# Patient Record
Sex: MALE | Race: WHITE | Employment: FULL TIME | URBAN - METROPOLITAN AREA
[De-identification: names, ages, dates, MRNs, and addresses within clinical notes are randomized per-mention and may not be internally consistent; named-entity substitution may affect disease eponyms.]

---

## 2024-11-20 ENCOUNTER — APPOINTMENT (OUTPATIENT)
Dept: CT IMAGING | Facility: HOSPITAL | Age: 64
DRG: 349 | End: 2024-11-20
Attending: EMERGENCY MEDICINE
Payer: COMMERCIAL

## 2024-11-20 ENCOUNTER — HOSPITAL ENCOUNTER (INPATIENT)
Facility: HOSPITAL | Age: 64
LOS: 3 days | Discharge: HOME | End: 2024-11-23
Attending: EMERGENCY MEDICINE
Payer: COMMERCIAL

## 2024-11-20 ENCOUNTER — APPOINTMENT (OUTPATIENT)
Dept: CT IMAGING | Facility: HOSPITAL | Age: 64
End: 2024-11-20
Attending: EMERGENCY MEDICINE
Payer: COMMERCIAL

## 2024-11-20 ENCOUNTER — HOSPITAL ENCOUNTER (INPATIENT)
Facility: HOSPITAL | Age: 64
LOS: 3 days | Discharge: HOME | DRG: 349 | End: 2024-11-23
Attending: EMERGENCY MEDICINE | Admitting: HOSPITALIST
Payer: COMMERCIAL

## 2024-11-20 DIAGNOSIS — K61.0 ABSCESS, PERIANAL: ICD-10-CM

## 2024-11-20 DIAGNOSIS — K61.0 PERIANAL ABSCESS: Primary | ICD-10-CM

## 2024-11-20 LAB
ANION GAP SERPL CALC-SCNC: 6 MMOL/L (ref 0–18)
BASOPHILS # BLD AUTO: 0.11 X10(3) UL (ref 0–0.2)
BASOPHILS NFR BLD AUTO: 0.8 %
BILIRUB UR QL: NEGATIVE
BUN BLD-MCNC: 17 MG/DL (ref 9–23)
BUN/CREAT SERPL: 18.1 (ref 10–20)
CALCIUM BLD-MCNC: 9.4 MG/DL (ref 8.7–10.4)
CHLORIDE SERPL-SCNC: 107 MMOL/L (ref 98–112)
CLARITY UR: CLEAR
CO2 SERPL-SCNC: 27 MMOL/L (ref 21–32)
CREAT BLD-MCNC: 0.94 MG/DL
DEPRECATED RDW RBC AUTO: 46 FL (ref 35.1–46.3)
EGFRCR SERPLBLD CKD-EPI 2021: 92 ML/MIN/1.73M2 (ref 60–?)
EOSINOPHIL # BLD AUTO: 0.21 X10(3) UL (ref 0–0.7)
EOSINOPHIL NFR BLD AUTO: 1.6 %
ERYTHROCYTE [DISTWIDTH] IN BLOOD BY AUTOMATED COUNT: 13.5 % (ref 11–15)
GLUCOSE BLD-MCNC: 101 MG/DL (ref 70–99)
GLUCOSE UR-MCNC: NORMAL MG/DL
HCT VFR BLD AUTO: 41.7 %
HGB BLD-MCNC: 14.5 G/DL
HGB UR QL STRIP.AUTO: NEGATIVE
IMM GRANULOCYTES # BLD AUTO: 0.04 X10(3) UL (ref 0–1)
IMM GRANULOCYTES NFR BLD: 0.3 %
KETONES UR-MCNC: 20 MG/DL
LEUKOCYTE ESTERASE UR QL STRIP.AUTO: NEGATIVE
LYMPHOCYTES # BLD AUTO: 1.57 X10(3) UL (ref 1–4)
LYMPHOCYTES NFR BLD AUTO: 12 %
MCH RBC QN AUTO: 32.1 PG (ref 26–34)
MCHC RBC AUTO-ENTMCNC: 34.8 G/DL (ref 31–37)
MCV RBC AUTO: 92.3 FL
MONOCYTES # BLD AUTO: 2.02 X10(3) UL (ref 0.1–1)
MONOCYTES NFR BLD AUTO: 15.5 %
NEUTROPHILS # BLD AUTO: 9.1 X10 (3) UL (ref 1.5–7.7)
NEUTROPHILS # BLD AUTO: 9.1 X10(3) UL (ref 1.5–7.7)
NEUTROPHILS NFR BLD AUTO: 69.8 %
NITRITE UR QL STRIP.AUTO: NEGATIVE
OSMOLALITY SERPL CALC.SUM OF ELEC: 292 MOSM/KG (ref 275–295)
PH UR: 6.5 [PH] (ref 5–8)
PLATELET # BLD AUTO: 306 10(3)UL (ref 150–450)
POTASSIUM SERPL-SCNC: 4.2 MMOL/L (ref 3.5–5.1)
PROT UR-MCNC: NEGATIVE MG/DL
RBC # BLD AUTO: 4.52 X10(6)UL
SODIUM SERPL-SCNC: 140 MMOL/L (ref 136–145)
SP GR UR STRIP: >1.03 (ref 1–1.03)
UROBILINOGEN UR STRIP-ACNC: NORMAL
WBC # BLD AUTO: 13.1 X10(3) UL (ref 4–11)

## 2024-11-20 PROCEDURE — 0D9Q0ZZ DRAINAGE OF ANUS, OPEN APPROACH: ICD-10-PCS | Performed by: EMERGENCY MEDICINE

## 2024-11-20 PROCEDURE — 74177 CT ABD & PELVIS W/CONTRAST: CPT | Performed by: EMERGENCY MEDICINE

## 2024-11-20 RX ORDER — MORPHINE SULFATE 2 MG/ML
0.5 INJECTION, SOLUTION INTRAMUSCULAR; INTRAVENOUS EVERY 2 HOUR PRN
Status: DISCONTINUED | OUTPATIENT
Start: 2024-11-20 | End: 2024-11-23

## 2024-11-20 RX ORDER — MORPHINE SULFATE 2 MG/ML
2 INJECTION, SOLUTION INTRAMUSCULAR; INTRAVENOUS EVERY 2 HOUR PRN
Status: DISCONTINUED | OUTPATIENT
Start: 2024-11-20 | End: 2024-11-23

## 2024-11-20 RX ORDER — PROCHLORPERAZINE EDISYLATE 5 MG/ML
5 INJECTION INTRAMUSCULAR; INTRAVENOUS EVERY 8 HOURS PRN
Status: DISCONTINUED | OUTPATIENT
Start: 2024-11-20 | End: 2024-11-23

## 2024-11-20 RX ORDER — MORPHINE SULFATE 2 MG/ML
1 INJECTION, SOLUTION INTRAMUSCULAR; INTRAVENOUS EVERY 2 HOUR PRN
Status: DISCONTINUED | OUTPATIENT
Start: 2024-11-20 | End: 2024-11-23

## 2024-11-20 RX ORDER — HYDROMORPHONE HYDROCHLORIDE 1 MG/ML
0.4 INJECTION, SOLUTION INTRAMUSCULAR; INTRAVENOUS; SUBCUTANEOUS EVERY 2 HOUR PRN
Status: DISCONTINUED | OUTPATIENT
Start: 2024-11-20 | End: 2024-11-23

## 2024-11-20 RX ORDER — IBUPROFEN 600 MG/1
600 TABLET, FILM COATED ORAL EVERY 6 HOURS PRN
Status: DISCONTINUED | OUTPATIENT
Start: 2024-11-20 | End: 2024-11-23

## 2024-11-20 RX ORDER — COFFEE XT/PHOSPHATIDYL SERINE 50 MG-50MG
2 TABLET,CHEWABLE ORAL NIGHTLY
COMMUNITY

## 2024-11-20 RX ORDER — OXYCODONE HYDROCHLORIDE 5 MG/1
10 TABLET ORAL EVERY 4 HOURS PRN
Status: DISCONTINUED | OUTPATIENT
Start: 2024-11-20 | End: 2024-11-23

## 2024-11-20 RX ORDER — OXYCODONE HYDROCHLORIDE 5 MG/1
5 TABLET ORAL EVERY 4 HOURS PRN
Status: DISCONTINUED | OUTPATIENT
Start: 2024-11-20 | End: 2024-11-23

## 2024-11-20 RX ORDER — IBUPROFEN 400 MG/1
400 TABLET, FILM COATED ORAL EVERY 6 HOURS PRN
Status: DISCONTINUED | OUTPATIENT
Start: 2024-11-20 | End: 2024-11-23

## 2024-11-20 RX ORDER — ACETAMINOPHEN 500 MG
1000 TABLET ORAL EVERY 6 HOURS PRN
Status: DISCONTINUED | OUTPATIENT
Start: 2024-11-20 | End: 2024-11-23

## 2024-11-20 RX ORDER — BISACODYL 10 MG
10 SUPPOSITORY, RECTAL RECTAL
Status: DISCONTINUED | OUTPATIENT
Start: 2024-11-20 | End: 2024-11-23

## 2024-11-20 RX ORDER — LIDOCAINE HYDROCHLORIDE 10 MG/ML
20 INJECTION, SOLUTION EPIDURAL; INFILTRATION; INTRACAUDAL; PERINEURAL ONCE
Status: COMPLETED | OUTPATIENT
Start: 2024-11-20 | End: 2024-11-20

## 2024-11-20 RX ORDER — HYDROMORPHONE HYDROCHLORIDE 1 MG/ML
0.8 INJECTION, SOLUTION INTRAMUSCULAR; INTRAVENOUS; SUBCUTANEOUS EVERY 2 HOUR PRN
Status: DISCONTINUED | OUTPATIENT
Start: 2024-11-20 | End: 2024-11-23

## 2024-11-20 RX ORDER — ACETAMINOPHEN 500 MG
500 TABLET ORAL EVERY 4 HOURS PRN
Status: DISCONTINUED | OUTPATIENT
Start: 2024-11-20 | End: 2024-11-23

## 2024-11-20 RX ORDER — CHLORAL HYDRATE 500 MG
1000 CAPSULE ORAL
COMMUNITY

## 2024-11-20 RX ORDER — SODIUM CHLORIDE 9 MG/ML
125 INJECTION, SOLUTION INTRAVENOUS CONTINUOUS
Status: DISCONTINUED | OUTPATIENT
Start: 2024-11-20 | End: 2024-11-20

## 2024-11-20 RX ORDER — ONDANSETRON 2 MG/ML
4 INJECTION INTRAMUSCULAR; INTRAVENOUS EVERY 6 HOURS PRN
Status: DISCONTINUED | OUTPATIENT
Start: 2024-11-20 | End: 2024-11-23

## 2024-11-20 RX ORDER — MORPHINE SULFATE 4 MG/ML
4 INJECTION, SOLUTION INTRAMUSCULAR; INTRAVENOUS ONCE
Status: COMPLETED | OUTPATIENT
Start: 2024-11-20 | End: 2024-11-20

## 2024-11-20 RX ORDER — SODIUM PHOSPHATE, DIBASIC AND SODIUM PHOSPHATE, MONOBASIC 7; 19 G/230ML; G/230ML
1 ENEMA RECTAL ONCE AS NEEDED
Status: DISCONTINUED | OUTPATIENT
Start: 2024-11-20 | End: 2024-11-23

## 2024-11-20 RX ORDER — SENNOSIDES 8.6 MG
17.2 TABLET ORAL NIGHTLY PRN
Status: DISCONTINUED | OUTPATIENT
Start: 2024-11-20 | End: 2024-11-23

## 2024-11-20 RX ORDER — POLYETHYLENE GLYCOL 3350 17 G/17G
17 POWDER, FOR SOLUTION ORAL DAILY PRN
Status: DISCONTINUED | OUTPATIENT
Start: 2024-11-20 | End: 2024-11-23

## 2024-11-20 NOTE — ED QUICK NOTES
Dr. Taylor at bedside updating patient.    Orders for admission, patient is aware of plan and ready to go upstairs. Any questions, please call ED RN Carola at extension [phone].     Patient Covid vaccination status: Fully vaccinated     COVID Test Ordered in ED: None    COVID Suspicion at Admission: N/A    Running Infusions:  None    Mental Status/LOC at time of transport: A&Ox4    Other pertinent information:   CIWA score: N/A   NIH score:  N/A

## 2024-11-20 NOTE — CONSULTS
[facility]  Report of Consultation    Robin Oconnor Patient Status:  Inpatient    1960 MRN [id number]   Location [facility] 1W Attending Travon Saucedo MD   Hosp Day # 0 PCP No primary care provider on file.     Requesting Physician:  Emmanuel Taylor MD     Reason for Consultation:  Lucy-anal abscess    Chief Complaint:  Anal pain    History of Present Illness:  Robin Oconnor is a 64 year old male who presents to [facility] on 24 for rectal pain starting this past . It began as low grade and progressively worsened. He has felt similar pain in the past 3 years ago when he had a lucy-anal abscess that required I&D in the OR. He denies associated nausea or vomiting. He denies issues with diarrhea or constipation, last bowel movement was yesterday without blood in his stool. He denies discharge from his rectum.  He denies fever or chills.     Upon presentation to the hospital, patient afebrile and hemodynamically stable. WBCs 13.1, blood and abscess cultures pending. CT AP with findings of fluid collection posterior to anal sphincter measuring 2 x 4 x 4 cm concerning for perianal abscess. No soft tissue emphysema, no intrapelvic connection, no bowel obstruction. Patient started on IV antibiotics.     Past medical history significant for past perianal abscess requiring I&D,     Past abdominal surgical history negative as per patient.    Family history is negative for colon cancer, IBD  Patient has never had a colonoscopy  Patient denies taking blood thinners    History:  No past medical history on file.  History reviewed. No pertinent surgical history.  Family History   Problem Relation Age of Onset    No Known Problems Father     No Known Problems Mother       reports that he has been smoking cigarettes. He does not have any smokeless tobacco history on file. He reports current alcohol use. He reports that he does not use  drugs.    Allergies:  Allergies[1]    Medications:  Medications Prior to Admission   Medication Sig    Multiple Vitamins-Minerals (ONE A DAY MEN 50 PLUS OR) Take 1 tablet by mouth daily with dinner.    omega-3 fatty acids 1000 MG Oral Cap Take 1,000 mg by mouth daily with dinner.    Esomeprazole Magnesium 20 MG Oral Capsule Delayed Release Take 1 capsule (20 mg total) by mouth every morning before breakfast.    Misc Natural Products (NEURIVA) Oral Chew Tab Chew 2 tablets by mouth at bedtime.         Current Facility-Administered Medications:     sodium chloride 0.9% infusion, 125 mL/hr, Intravenous, Continuous    piperacillin-tazobactam (Zosyn) 4.5 g in dextrose 5% 100 mL IVPB-ADDV, 4.5 g, Intravenous, Q8H    Review of Systems:  Review of Systems   Constitutional:  Negative for chills, fatigue and fever.   Respiratory:  Negative for shortness of breath.    Cardiovascular:  Negative for chest pain.   Gastrointestinal:  Positive for rectal pain. Negative for nausea, vomiting, abdominal pain, diarrhea, constipation, blood in stool and anal bleeding.   Genitourinary:  Negative for bladder incontinence, dysuria and hematuria.       Physical Exam:  /73 (BP Location: Right arm)   Pulse 60   Temp 98.2 °F (36.8 °C) (Oral)   Resp 20   Ht 5' 10\" (1.778 m)   Wt 148 lb 3.2 oz (67.2 kg)   SpO2 98%   BMI 21.26 kg/m²   Physical Exam  Constitutional:       General: He is not in acute distress.     Appearance: Normal appearance. He is normal weight. He is not ill-appearing.   HENT:      Head: Normocephalic and atraumatic.      Mouth/Throat:      Mouth: Mucous membranes are moist.      Pharynx: Oropharynx is clear.   Eyes:      Conjunctiva/sclera: Conjunctivae normal.   Cardiovascular:      Rate and Rhythm: Normal rate and regular rhythm.      Pulses: Normal pulses.      Heart sounds: Normal heart sounds.   Pulmonary:      Effort: Pulmonary effort is normal.      Breath sounds: Normal breath sounds.   Abdominal:       General: Abdomen is flat. Bowel sounds are normal.      Palpations: Abdomen is soft.   Genitourinary:     Comments: External anal sphincter without swelling or obvious fluctuance. Dried blood from attempted I&D in ER. DARRICK with extreme pain and discomfort, mild prominence of swelling noted of posterior rectal wall  Neurological:      General: No focal deficit present.      Mental Status: He is alert and oriented to person, place, and time.   Psychiatric:         Mood and Affect: Mood normal.         Behavior: Behavior normal.         Laboratory Data:  Recent Labs   Lab 11/20/24  0803   RBC 4.52   HGB 14.5   HCT 41.7   MCV 92.3   MCH 32.1   MCHC 34.8   RDW 13.5   NEPRELIM 9.10*   WBC 13.1*   .0       Recent Labs   Lab 11/20/24  0803   *   BUN 17   CREATSERUM 0.94   CA 9.4      K 4.2      CO2 27.0         No results for input(s): \"PTP\", \"INR\", \"PTT\" in the last 168 hours.      CT ABDOMEN+PELVIS(CONTRAST ONLY)(CPT=74177)    Result Date: 11/20/2024  CONCLUSION: Posterior to the anal sphincters there is a 2 x 4 x 4 cm fluid collection, concerning for perianal abscess.  No soft tissue emphysema.  No intrapelvic extension.  No bowel obstruction   Dictated by (CST): Heber Ballesteros MD on 11/20/2024 at 9:23 AM     Finalized by (CST): Heber Ballesteros MD on 11/20/2024 at 9:29 AM                     Medical Decision Making         Impression:  Patient Active Problem List   Diagnosis    Perianal abscess       Perianal abscess    Plan:  Tentative plan for I&D in OR tomorrow with Dr. Beasley, patient is agreeable.  OK for diet, NPO at midnight  Continue to trend WBCs  Analgesics as needed  Continue IV antibiotics  Case discussed with Dr. Beasley.    Thank you for allowing me to participate in the care of the patient.   JUANA Siddiqi  11/20/2024  12:35 PM                           [1] No Known Allergies

## 2024-11-20 NOTE — ED QUICK NOTES
Patient and visitor at bedside updated on wait for CT scan and then dispo. Verbalized understanding, denies further needs at this time. Call light within reach.

## 2024-11-20 NOTE — PLAN OF CARE
Problem: Patient Centered Care  Goal: Patient preferences are identified and integrated in the patient's plan of care  Description: Interventions:  - What would you like us to know as we care for you? Lives at home  - Provide timely, complete, and accurate information to patient/family  - Incorporate patient and family knowledge, values, beliefs, and cultural backgrounds into the planning and delivery of care  - Encourage patient/family to participate in care and decision-making at the level they choose  - Honor patient and family perspectives and choices  Note: Received from ER with complaint of rectal pain, noted to have rectal abscess. Patient alert and oriented. Pain level 5/10 upon arrival to [facility]. Patient oriented to room, call light , plan of care discussed. Verbalized understanding. IVF infusing. NPO as ordered. Bed on low and locked position, call light within reach.

## 2024-11-20 NOTE — H&P
HOSPITALIST HISTORY AND PHYSICAL     CC:   Chief Complaint   Patient presents with    Anal Problem        PCP: No primary care provider on file.> about to establish w Jean Benitez    History of Present Illness:   Patient is a 64 year old male with PMH sig for psoriasis not on medication, hx yumiko-anal abscess 2021 that was drained (tx at [place]) here w 3 days severe pain similar to 2021 episode. Progressively worsening- localized to perianal region no radiation denies fevers or other focal sx. No known hx DM, not on immunosuppressives does have a long standing hx psoriasis. Labs/ CT as noted below.      No past medical history on file.   History reviewed. No pertinent surgical history.     ALL:  Allergies[1]         Social History     Tobacco Use    Smoking status: Every Day     Types: Cigarettes    Smokeless tobacco: Not on file   Substance Use Topics    Alcohol use: Yes     Comment: On weekends.        Fam Hx  No family history on file.    Review of Systems  10 point Comprehensive ROS reviewed and negative except for what's stated above.     OBJECTIVE:  /76   Pulse 64   Temp 98.2 °F (36.8 °C)   Resp 16   Ht 5' 10.5\" (1.791 m)   Wt 150 lb (68 kg)   SpO2 98%   BMI 21.22 kg/m²     GEN: NAD, AAO  NECK: supple, no LAD  HEENT- sclera anti-icteric, OP- MMM  CV: rrr, +s1/s2, PMI non displaced  LUNGS: CTAB, normal resp effort  ABL soft, NT/ND, NABS, no HSC  EXT: no LE edema b/l , DP pulses 2+ b/l  Neuro: sensation intact, no focal deficits  SKIN- red patchy plaques on elbows/ knees, yumiko-anal erythema   PSYCH- normal mentation, normal affect      LABS:   Lab Results   Component Value Date    WBC 13.1 11/20/2024    HGB 14.5 11/20/2024    HCT 41.7 11/20/2024    .0 11/20/2024    CREATSERUM 0.94 11/20/2024    BUN 17 11/20/2024     11/20/2024    K 4.2 11/20/2024     11/20/2024    CO2 27.0 11/20/2024     11/20/2024    CA 9.4 11/20/2024       Radiology: CT ABDOMEN+PELVIS(CONTRAST  ONLY)(CPT=74177)    Result Date: 11/20/2024  PROCEDURE: CT ABDOMEN + PELVIS (CONTRAST ONLY) (CPT=74177)  COMPARISON: None.  INDICATIONS: rectal pain  TECHNIQUE: CT images of the abdomen and pelvis were obtained with non-ionic intravenous contrast material.  Automated exposure control for dose reduction was used. Adjustment of the mA and/or kV was done based on the patient's size. Use of iterative reconstruction technique for dose reduction was used.  Dose information is transmitted to the ACR (American College of Radiology) NRDR (National Radiology Data Registry) which includes the Dose Index Registry.  FINDINGS:  LIVER: No enlargement, atrophy, abnormal density, or significant focal lesion.  BILIARY: The gallbladder is present.  No intra- or extrahepatic biliary ductal dilatation. SPLEEN: No enlargement or focal lesion.  STOMACH: No gastric obstruction.  Duodenum is unremarkable. PANCREAS: No lesion, fluid collection, ductal dilatation, or atrophy.  ADRENALS: No nodule or enlargement. KIDNEYS: No enhancing renal lesion or hydroureteronephrosis. AORTA/VASCULAR:   Aortic atherosclerosis without aneurysm.  No dissection. RETROPERITONEUM: No mass or enlarged adenopathy.  BOWEL/MESENTERY:  There is no small or large bowel obstruction. The terminal ileum and appendix (series 2, image 82) are within normal limits. There is scattered descending and sigmoid colonic diverticulosis. There is no free air, free fluid, or lymphadenopathy in the abdomen or pelvis. ABDOMINAL WALL: Curvilinear fluid collection measuring approximately 23 x 37 x 39 mm posterior to the anorectal junction to the level of the external anal sphincter, coursing from the posterior 5 o'clock to the right 9 o'clock position.  No soft tissue emphysema. URINARY BLADDER: No visible focal wall thickening, lesion or calculus.  PELVIC NODES: No enlarged mass or adenopathy.   PELVIC ORGANS: Prostate is enlarged measuring 55 mm in transverse diameter. BONES:    Mild-moderate degenerative endplate change and disc disease within the spine. LUNG BASES: Linear bands of atelectasis/scar in the lung bases.  The visualized heart has normal size.  No visible coronary artery calcifications. OTHER: Negative.          CONCLUSION: Posterior to the anal sphincters there is a 2 x 4 x 4 cm fluid collection, concerning for perianal abscess.  No soft tissue emphysema.  No intrapelvic extension.  No bowel obstruction   Dictated by (CST): Heber Ballesteros MD on 11/20/2024 at 9:23 AM     Finalized by (CST): Heber Ballesteros MD on 11/20/2024 at 9:29 AM               ASSESSMENT / PLAN:    Perianal abscess 2021 here w recurrent abscess  Leukocytosis from above  - d/w Dr Taylor- surgery consulted requested ID as well in anticipation of possible need for ongoing iv abx after dc  - bcx sent  - sp attempted bedside I and D w/o drainaeage f/u surg recs- NPO aft 12 in anticipation of drainage   - started zosyn   - f/u cx data and CBC  - re risk factors- has psoriasis. Not on immunosuppressives denies other known pmhx but will check HgA1c. Also no prior colonscopy    HTN  - hx of this noted in [place] chart denies dx and not on medications    Nicotine use hx  - not current active issue    Insomnia  - can do melatonin in house    DVT ppx- SCDs ordered not chemical in case of need for surigcal drainage     Est dc date 2 midnights    Outpatient records or previous hospital records reviewed as detailed above.    ROYA hospitalist to continue to follow patient while in house      ROYA Larios Hospitalist  Pager [phone]    11/20/2024  10:00 AM         [1] No Known Allergies

## 2024-11-20 NOTE — ED QUICK NOTES
Per MD, patient okay for transport. Patient transported to IP bed assignment via stretcher by EDT. Patient in stable condition, NAD, left department with all belongings.

## 2024-11-20 NOTE — CONSULTS
[facility]  part of [facility] Infectious Disease  Report of Consultation    Robin Oconnor Patient Status:  Inpatient    1960 MRN [id number]   Location [facility] 1W Attending Travon Saucedo MD   Hosp Day # 0 PCP No primary care provider on file.     Date of Admission:  2024  Date of Consult:  2024    Reason for Consultation:  Perianal abscess    History of Present Illness:  Robin Oconnor is a a(n) 64 year old male being seen at your request regarding a perirectal/perianal abscess.  Patient does have a h/o same, admitted to [facility] for a horseshoe perirectal abscess that required I&D 2021.    Patient reports he has never had a colonoscopy.    Patient presented to the ED today with a c/o rectal pain worsening for several days PTA.  He had a normal BM yesterday.  Patient did have area identified in ED with attempted I&D.  Minimal purulent material resulted.  CT was done and demonstrated deeper abscess.  Patient was stated on IV zosyn and admitted for further evaluation by general surgery.      Patient denies F/C.  No N/V/D.  No systemic s/s of sepsis.  We are asked to see and assist.    History:  No past medical history on file.  History reviewed. No pertinent surgical history.  No family history on file.   reports that he has been smoking cigarettes. He does not have any smokeless tobacco history on file. He reports current alcohol use. He reports that he does not use drugs.    Allergies:  Allergies[1]    Medications:    Current Facility-Administered Medications:     sodium chloride 0.9% infusion, 125 mL/hr, Intravenous, Continuous    Review of Systems:    Constitutional:  No fevers, chills, diaphoresis, weight changes.   HEENT:  No visual changes, oral ulcers, sore throat, difficulty swallowing.   Respiratory: Negative for cough, sputum, hemoptysis, chest pain, wheezing, dyspnea on exertion, or stridor.   Cardiovascular: Negative for chest pain,  palpitations, irregular heart beats.   Gastrointestinal:  Rectal pain.   Genitourinary:  No dysuria, hematuria, urine urgency or frequency.   Integument/breast: Negative for rash, skin lesions, and pruritus.   Hematologic/lymphatic: Negative for easy bruising, bleeding, and lymphadenopathy.   Musculoskeletal: Negative for myalgias, arthralgias, muscle weakness.   Neurological: No focal neurologic deficits, seizures, tremors.   Psych:  No h/o anxiety, depression, other psych d/o.   Endocrine: No history of of diabetes, thyroid disorder.    Remainder of 12 point review of systems otherwise negative.    Vital signs in last 24 hours:  Patient Vitals for the past 24 hrs:   BP Temp Temp src Pulse Resp SpO2 Height Weight   11/20/24 1105 135/73 98.2 °F (36.8 °C) Oral 60 20 98 % 5' 10\" (1.778 m) 148 lb 3.2 oz (67.2 kg)   11/20/24 0945 138/76 -- -- 64 16 98 % -- --   11/20/24 0800 122/65 -- -- 69 16 100 % -- --   11/20/24 0621 122/78 98.2 °F (36.8 °C) -- 95 21 95 % 5' 10.5\" (1.791 m) 150 lb (68 kg)       Intake/Output:  No intake/output data recorded.    Physical Exam:   General: Awake, alert, non-tox and in NAD.   Head: Normocephalic, without obvious abnormality, atraumatic.   Eyes: Conjunctivae/corneas clear.  No scleral icterus.  No conjunctival     hemorrhage.   Nose: Nares normal.   Throat:  Oropharynx clear, MMs moist.   Neck: Trachea ML, no masses.   Lungs: CTA b/l no rhonchi, rales, wheezes.   Chest wall: No tenderness or deformity.   Heart: Regular rate and rhythm, normal S1S2, no murmurs.   Abdomen: Soft, NT/ND.  Bowel sounds present.  No organomegaly.   Extremity: No edema.   Skin: No rashes or lesions.   Neurological: No focal neurologic deficits.    Lab Data Review:  Lab Results   Component Value Date    WBC 13.1 11/20/2024    HGB 14.5 11/20/2024    HCT 41.7 11/20/2024    .0 11/20/2024    CREATSERUM 0.94 11/20/2024    BUN 17 11/20/2024     11/20/2024    K 4.2 11/20/2024     11/20/2024    CO2  27.0 11/20/2024     11/20/2024    CA 9.4 11/20/2024      Cultures:   Blood cultures obtained  Culture from rectal drainage obtained    Radiology:    CONCLUSION: Posterior to the anal sphincters there is a 2 x 4 x 4 cm fluid collection, concerning for perianal abscess.  No soft tissue emphysema.  No intrapelvic extension.  No bowel obstruction     Assessment and Plan:    Complicated perianal abscess in this patient who presented with rectal pain and a h/o similar presentation in 2021 at [facility]  - s/p attempted I&D in the ED, minimal purulent material drained  - CT with 2x4x4 fluid collection, general surgery to see  - Blood cultures obtained  - Abscess cultures obtained  - IV zosyn started    2.  H/o horseshoe perirectal abscess in 2021 which required surgical drainage 5/8/21 at [facility]  - Patient has never had a colonoscopy    3.  H/o psoriasis  - At risk, theoretically, for other autoimmune processes ?Crohn's disease, etc.    4.  Disposition - inpatient.  Continue IV zosyn and f/u all cultures.  Awaiting surgical input/intervention.  Final choice, route, and duration of antibiotics to be determined pending clinical course.  Last time he was able to step down to p.o. Rx at discharge.  Will need a colonoscopy after acute infection resolves.  Will follow.    Madelyn Barragan DO, [facility] Infect[facility]  [phone]    11/20/2024  11:17 AM         [1] No Known Allergies

## 2024-11-20 NOTE — ED QUICK NOTES
Pt A&Ox4.  Pt states rectal pain x3 days. Pt states abscess to area x3 years ago that required I&D. Pt denies drainage, fever, or chills.

## 2024-11-20 NOTE — ED INITIAL ASSESSMENT (HPI)
Pt to ed w/c of rectal problem. States he has been seen before for same issue but unsure if he was told he had an abscess. PT reports they drained the area before since he had an infection. Pt reports pain 10/10. Denies bleeding.

## 2024-11-20 NOTE — ED PROVIDER NOTES
Patient Seen in: [facility] Emergency Department      History     Chief Complaint   Patient presents with    Anal Problem     Stated Complaint: rectal pain    Subjective:   HPI      64-year-old male with history of perianal abscess post incision and drainage in 2021 rectal pain.  The patient reports pain over the past 4 days which has increased over the past 2 days.  He denies any anterior abdominal pain.  No fevers.  No nausea or vomiting.  No diarrhea.  He reports having a normal bowel movement yesterday.    Objective:     No pertinent past medical history.            History reviewed. No pertinent surgical history.             No pertinent social history.                Physical Exam     ED Triage Vitals [11/20/24 0621]   /78   Pulse 95   Resp 21   Temp 98.2 °F (36.8 °C)   Temp src    SpO2 95 %   O2 Device None (Room air)       Current Vitals:   Vital Signs  BP: 138/76  Pulse: 64  Resp: 16  Temp: 98.2 °F (36.8 °C)  MAP (mmHg): 91    Oxygen Therapy  SpO2: 98 %  O2 Device: None (Room air)        Physical Exam    General Appearance: alert, no distress  Eyes: pupils equal and round no pallor or injection  ENT, Mouth: mucous membranes moist  Respiratory: there are no retractions, lungs are clear to auscultation  Cardiovascular: regular rate and rhythm  Gastrointestinal:  abdomen is soft and non tender, no masses, bowel sounds normal  Rectal: No masses noted.  There is erythema, mild fluctuance, and tenderness noted to the left perianal area near the anus at the 8 o'clock position drainage noted.  Neurological: Speech normal.  Moving extremities x 4.  Skin: warm and dry, no rashes.  Musculoskeletal: neck is supple non tender        Extremities are symmetrical, full range of motion  Psychiatric: patient is oriented X 3, there is no agitation    DIFFERENTIAL DIAGNOSIS: After history and physical exam differential diagnosis was considered for perianal abscess or other        ED Course     Labs Reviewed    BASIC METABOLIC PANEL (8) - Abnormal; Notable for the following components:       Result Value    Glucose 101 (*)     All other components within normal limits   CBC WITH DIFFERENTIAL WITH PLATELET - Abnormal; Notable for the following components:    WBC 13.1 (*)     Neutrophil Absolute Prelim 9.10 (*)     All other components within normal limits   SCAN SLIDE   MD BLOOD SMEAR CONSULT   URINALYSIS WITH CULTURE REFLEX   AEROBIC BACTERIAL CULTURE   BLOOD CULTURE   BLOOD CULTURE                 MDM      Procedure:    Abscess drainage:  The patient abscess was located perirectal area.  I obtained verbal consent from the patient to drain the abscess who was informed about the possibility of bleeding, pain and worsening of the condition. The abscess was incised with a scalpel and minimal to no purulent drainage was expressed.  The patient tolerated the procedure well.  The procedure was performed by myself.    Laboratory studies were then obtained and the patient was sent for a CT which shows a perianal abscess.  Plan to admit for surgical evaluation and IV antibiotics.  Communicated with Dr. Barba, general surgery.  Also discussed with Dr. Saucedo, hospitalist.  Also communicated with Dr. Barragan, infectious disease.  She recommends antibiotics as ordered.  Admission disposition: 11/20/2024 10:05 AM           Medical Decision Making      Disposition and Plan     Clinical Impression:  1. Perianal abscess         Disposition:  Admit  11/20/2024 10:05 am    Follow-up:  No follow-up provider specified.        Medications Prescribed:  There are no discharge medications for this patient.          Supplementary Documentation:         Hospital Problems       Present on Admission             ICD-10-CM Noted POA    * (Principal) Perianal abscess K61.0 11/20/2024 Unknown

## 2024-11-21 ENCOUNTER — ANESTHESIA (OUTPATIENT)
Dept: SURGERY | Facility: HOSPITAL | Age: 64
End: 2024-11-21
Payer: COMMERCIAL

## 2024-11-21 ENCOUNTER — ANESTHESIA EVENT (OUTPATIENT)
Dept: SURGERY | Facility: HOSPITAL | Age: 64
End: 2024-11-21
Payer: COMMERCIAL

## 2024-11-21 LAB
ANION GAP SERPL CALC-SCNC: 7 MMOL/L (ref 0–18)
BASOPHILS # BLD: 0 X10(3) UL (ref 0–0.2)
BASOPHILS NFR BLD: 0 %
BUN BLD-MCNC: 15 MG/DL (ref 9–23)
BUN/CREAT SERPL: 16.7 (ref 10–20)
CALCIUM BLD-MCNC: 9.1 MG/DL (ref 8.7–10.4)
CHLORIDE SERPL-SCNC: 104 MMOL/L (ref 98–112)
CO2 SERPL-SCNC: 26 MMOL/L (ref 21–32)
CREAT BLD-MCNC: 0.9 MG/DL
DEPRECATED RDW RBC AUTO: 43.5 FL (ref 35.1–46.3)
EGFRCR SERPLBLD CKD-EPI 2021: 97 ML/MIN/1.73M2 (ref 60–?)
EOSINOPHIL # BLD: 0.48 X10(3) UL (ref 0–0.7)
EOSINOPHIL NFR BLD: 3 %
ERYTHROCYTE [DISTWIDTH] IN BLOOD BY AUTOMATED COUNT: 13.2 % (ref 11–15)
EST. AVERAGE GLUCOSE BLD GHB EST-MCNC: 111 MG/DL (ref 68–126)
GLUCOSE BLD-MCNC: 95 MG/DL (ref 70–99)
HBA1C MFR BLD: 5.5 % (ref ?–5.7)
HCT VFR BLD AUTO: 41.3 %
HGB BLD-MCNC: 14.4 G/DL
LYMPHOCYTES NFR BLD: 13 %
LYMPHOCYTES NFR BLD: 2.09 X10(3) UL (ref 1–4)
MCH RBC QN AUTO: 31.2 PG (ref 26–34)
MCHC RBC AUTO-ENTMCNC: 34.9 G/DL (ref 31–37)
MCV RBC AUTO: 89.4 FL
MONOCYTES # BLD: 1.45 X10(3) UL (ref 0.1–1)
MONOCYTES NFR BLD: 9 %
MORPHOLOGY: NORMAL
MYELOCYTES # BLD: 0.16 X10(3) UL
MYELOCYTES NFR BLD: 1 %
NEUTROPHILS # BLD AUTO: 11.92 X10 (3) UL (ref 1.5–7.7)
NEUTROPHILS NFR BLD: 73 %
NEUTS BAND NFR BLD: 1 %
NEUTS HYPERSEG # BLD: 11.91 X10(3) UL (ref 1.5–7.7)
OSMOLALITY SERPL CALC.SUM OF ELEC: 285 MOSM/KG (ref 275–295)
PLATELET # BLD AUTO: 324 10(3)UL (ref 150–450)
PLATELET MORPHOLOGY: NORMAL
POTASSIUM SERPL-SCNC: 3.8 MMOL/L (ref 3.5–5.1)
RBC # BLD AUTO: 4.62 X10(6)UL
SODIUM SERPL-SCNC: 137 MMOL/L (ref 136–145)
TOTAL CELLS COUNTED BLD: 100
WBC # BLD AUTO: 16.1 X10(3) UL (ref 4–11)

## 2024-11-21 PROCEDURE — 99223 1ST HOSP IP/OBS HIGH 75: CPT | Performed by: STUDENT IN AN ORGANIZED HEALTH CARE EDUCATION/TRAINING PROGRAM

## 2024-11-21 PROCEDURE — 46050 I&D PERIANAL ABSCESS SUPFC: CPT | Performed by: STUDENT IN AN ORGANIZED HEALTH CARE EDUCATION/TRAINING PROGRAM

## 2024-11-21 PROCEDURE — 0HB9XZX EXCISION OF PERINEUM SKIN, EXTERNAL APPROACH, DIAGNOSTIC: ICD-10-PCS | Performed by: STUDENT IN AN ORGANIZED HEALTH CARE EDUCATION/TRAINING PROGRAM

## 2024-11-21 PROCEDURE — 46606 ANOSCOPY AND BIOPSY: CPT | Performed by: STUDENT IN AN ORGANIZED HEALTH CARE EDUCATION/TRAINING PROGRAM

## 2024-11-21 RX ORDER — NALOXONE HYDROCHLORIDE 0.4 MG/ML
80 INJECTION, SOLUTION INTRAMUSCULAR; INTRAVENOUS; SUBCUTANEOUS AS NEEDED
Status: DISCONTINUED | OUTPATIENT
Start: 2024-11-21 | End: 2024-11-21 | Stop reason: HOSPADM

## 2024-11-21 RX ORDER — SODIUM CHLORIDE, SODIUM LACTATE, POTASSIUM CHLORIDE, CALCIUM CHLORIDE 600; 310; 30; 20 MG/100ML; MG/100ML; MG/100ML; MG/100ML
INJECTION, SOLUTION INTRAVENOUS CONTINUOUS
Status: DISCONTINUED | OUTPATIENT
Start: 2024-11-21 | End: 2024-11-21 | Stop reason: HOSPADM

## 2024-11-21 RX ORDER — OMEGA-3 FATTY ACIDS/FISH OIL 300-1000MG
200 CAPSULE ORAL EVERY 6 HOURS PRN
Qty: 30 CAPSULE | Refills: 0 | Status: SHIPPED | OUTPATIENT
Start: 2024-11-21 | End: 2024-12-21

## 2024-11-21 RX ORDER — PROCHLORPERAZINE EDISYLATE 5 MG/ML
5 INJECTION INTRAMUSCULAR; INTRAVENOUS EVERY 8 HOURS PRN
Status: DISCONTINUED | OUTPATIENT
Start: 2024-11-21 | End: 2024-11-21 | Stop reason: HOSPADM

## 2024-11-21 RX ORDER — HYDROMORPHONE HYDROCHLORIDE 1 MG/ML
0.6 INJECTION, SOLUTION INTRAMUSCULAR; INTRAVENOUS; SUBCUTANEOUS EVERY 5 MIN PRN
Status: DISCONTINUED | OUTPATIENT
Start: 2024-11-21 | End: 2024-11-21 | Stop reason: HOSPADM

## 2024-11-21 RX ORDER — LIDOCAINE HYDROCHLORIDE 10 MG/ML
INJECTION, SOLUTION EPIDURAL; INFILTRATION; INTRACAUDAL; PERINEURAL AS NEEDED
Status: DISCONTINUED | OUTPATIENT
Start: 2024-11-21 | End: 2024-11-21 | Stop reason: SURG

## 2024-11-21 RX ORDER — MORPHINE SULFATE 4 MG/ML
2 INJECTION, SOLUTION INTRAMUSCULAR; INTRAVENOUS EVERY 10 MIN PRN
Status: DISCONTINUED | OUTPATIENT
Start: 2024-11-21 | End: 2024-11-21 | Stop reason: HOSPADM

## 2024-11-21 RX ORDER — ONDANSETRON 2 MG/ML
4 INJECTION INTRAMUSCULAR; INTRAVENOUS EVERY 6 HOURS PRN
Status: DISCONTINUED | OUTPATIENT
Start: 2024-11-21 | End: 2024-11-21 | Stop reason: HOSPADM

## 2024-11-21 RX ORDER — HYDROMORPHONE HYDROCHLORIDE 1 MG/ML
0.4 INJECTION, SOLUTION INTRAMUSCULAR; INTRAVENOUS; SUBCUTANEOUS EVERY 5 MIN PRN
Status: DISCONTINUED | OUTPATIENT
Start: 2024-11-21 | End: 2024-11-21 | Stop reason: HOSPADM

## 2024-11-21 RX ORDER — SODIUM CHLORIDE, SODIUM LACTATE, POTASSIUM CHLORIDE, CALCIUM CHLORIDE 600; 310; 30; 20 MG/100ML; MG/100ML; MG/100ML; MG/100ML
INJECTION, SOLUTION INTRAVENOUS CONTINUOUS PRN
Status: DISCONTINUED | OUTPATIENT
Start: 2024-11-21 | End: 2024-11-21 | Stop reason: SURG

## 2024-11-21 RX ORDER — OXYCODONE HYDROCHLORIDE 5 MG/1
5 TABLET ORAL EVERY 4 HOURS PRN
Qty: 3 TABLET | Refills: 0 | Status: SHIPPED | OUTPATIENT
Start: 2024-11-21

## 2024-11-21 RX ORDER — MORPHINE SULFATE 10 MG/ML
6 INJECTION, SOLUTION INTRAMUSCULAR; INTRAVENOUS EVERY 10 MIN PRN
Status: DISCONTINUED | OUTPATIENT
Start: 2024-11-21 | End: 2024-11-21 | Stop reason: HOSPADM

## 2024-11-21 RX ORDER — BUPIVACAINE HYDROCHLORIDE AND EPINEPHRINE 2.5; 5 MG/ML; UG/ML
INJECTION, SOLUTION INFILTRATION; PERINEURAL AS NEEDED
Status: DISCONTINUED | OUTPATIENT
Start: 2024-11-21 | End: 2024-11-21 | Stop reason: HOSPADM

## 2024-11-21 RX ORDER — DEXAMETHASONE SODIUM PHOSPHATE 4 MG/ML
VIAL (ML) INJECTION AS NEEDED
Status: DISCONTINUED | OUTPATIENT
Start: 2024-11-21 | End: 2024-11-21 | Stop reason: SURG

## 2024-11-21 RX ORDER — POTASSIUM CHLORIDE 1500 MG/1
40 TABLET, EXTENDED RELEASE ORAL ONCE
Status: COMPLETED | OUTPATIENT
Start: 2024-11-21 | End: 2024-11-21

## 2024-11-21 RX ORDER — PHENYLEPHRINE HCL 10 MG/ML
VIAL (ML) INJECTION AS NEEDED
Status: DISCONTINUED | OUTPATIENT
Start: 2024-11-21 | End: 2024-11-21 | Stop reason: SURG

## 2024-11-21 RX ORDER — HYDROMORPHONE HYDROCHLORIDE 1 MG/ML
0.2 INJECTION, SOLUTION INTRAMUSCULAR; INTRAVENOUS; SUBCUTANEOUS EVERY 5 MIN PRN
Status: DISCONTINUED | OUTPATIENT
Start: 2024-11-21 | End: 2024-11-21 | Stop reason: HOSPADM

## 2024-11-21 RX ORDER — ONDANSETRON 2 MG/ML
INJECTION INTRAMUSCULAR; INTRAVENOUS AS NEEDED
Status: DISCONTINUED | OUTPATIENT
Start: 2024-11-21 | End: 2024-11-21 | Stop reason: SURG

## 2024-11-21 RX ORDER — MORPHINE SULFATE 4 MG/ML
4 INJECTION, SOLUTION INTRAMUSCULAR; INTRAVENOUS EVERY 10 MIN PRN
Status: DISCONTINUED | OUTPATIENT
Start: 2024-11-21 | End: 2024-11-21 | Stop reason: HOSPADM

## 2024-11-21 RX ORDER — ACETAMINOPHEN 500 MG
1000 TABLET ORAL EVERY 4 HOURS PRN
Qty: 30 TABLET | Refills: 0 | Status: SHIPPED | OUTPATIENT
Start: 2024-11-21

## 2024-11-21 RX ADMIN — PHENYLEPHRINE HCL 100 MCG: 10 MG/ML VIAL (ML) INJECTION at 14:22:00

## 2024-11-21 RX ADMIN — SODIUM CHLORIDE, SODIUM LACTATE, POTASSIUM CHLORIDE, CALCIUM CHLORIDE: 600; 310; 30; 20 INJECTION, SOLUTION INTRAVENOUS at 14:10:00

## 2024-11-21 RX ADMIN — PHENYLEPHRINE HCL 50 MCG: 10 MG/ML VIAL (ML) INJECTION at 14:36:00

## 2024-11-21 RX ADMIN — DEXAMETHASONE SODIUM PHOSPHATE 8 MG: 4 MG/ML VIAL (ML) INJECTION at 14:25:00

## 2024-11-21 RX ADMIN — ONDANSETRON 4 MG: 2 INJECTION INTRAMUSCULAR; INTRAVENOUS at 14:45:00

## 2024-11-21 RX ADMIN — LIDOCAINE HYDROCHLORIDE 50 MG: 10 INJECTION, SOLUTION EPIDURAL; INFILTRATION; INTRACAUDAL; PERINEURAL at 14:16:00

## 2024-11-21 NOTE — PROGRESS NOTES
[facility] Hospitalist Progress Note     CC: Hospital Follow up    PCP: No primary care provider on file.       Assessment/Plan:     Principal Problem:    Perianal abscess    64 M with PMH psoriasis who presents with recurrent perianal abscess    Perianal abscess 2021 here w recurrent abscess   -Appreciate general surgery consultation planning I&D in the OR today  -Continue Zosyn follow-up cultures  -ID service is following appreciate consultation  -A1c 5.5   -Trend WBC, leukocytosis is slightly increased today continue to trend    2. HTN?  -Not taking any medications monitor for now    FN:  IVF:None  Diet:NPO pending OR     Prophylaxis:   DVT: Hold pending OR     Dispo: Monitor inpatient   Code status: FULL CODE     Questions/concerns were discussed with patient and/or family by bedside.    Thank You,  John Olivares,     Hospitalist with [facility]     Subjective:     Feels well, rectal pain is improved, no fevers or chills, no chest pains, no SOB     OBJECTIVE:    Blood pressure 139/74, pulse 66, temperature 98.3 °F (36.8 °C), resp. rate 14, height 5' 10\" (1.778 m), weight 150 lb 6.4 oz (68.2 kg), SpO2 98%.    Temp:  [98.3 °F (36.8 °C)-100.1 °F (37.8 °C)] 98.3 °F (36.8 °C)  Pulse:  [63-85] 66  Resp:  [14-20] 14  BP: (112-139)/(56-74) 139/74  SpO2:  [97 %-98 %] 98 %      Intake/Output:    Intake/Output Summary (Last 24 hours) at 11/21/2024 1309  Last data filed at 11/21/2024 1115  Gross per 24 hour   Intake 1867 ml   Output 1320 ml   Net 547 ml       Last 3 Weights   11/21/24 0500 150 lb 6.4 oz (68.2 kg)   11/20/24 1105 148 lb 3.2 oz (67.2 kg)   11/20/24 0621 150 lb (68 kg)       Exam   Gen: No acute distress, alert and oriented x 3  Pulm: Lungs clear, normal respiratory effort  CV: Heart with regular rate and rhythm  Abd: Abdomen soft, nontender, non-distended  MSK: no clubbing, no cyanosis        Data Review:       Labs:     Recent Labs   Lab 11/20/24  0803 11/21/24  0644   RBC 4.52 4.62    HGB 14.5 14.4   HCT 41.7 41.3   MCV 92.3 89.4   MCH 32.1 31.2   MCHC 34.8 34.9   RDW 13.5 13.2   NEPRELIM 9.10* 11.92*   WBC 13.1* 16.1*   .0 324.0         Recent Labs   Lab 11/20/24  0803 11/21/24  0645   * 95   BUN 17 15   CREATSERUM 0.94 0.90   EGFRCR 92 97   CA 9.4 9.1    137   K 4.2 3.8    104   CO2 27.0 26.0       No results for input(s): \"ALT\", \"AST\", \"ALB\", \"AMYLASE\", \"LIPASE\", \"LDH\" in the last 168 hours.    Invalid input(s): \"ALPHOS\", \"TBIL\", \"DBIL\", \"TPROT\"      Imaging:  CT ABDOMEN+PELVIS(CONTRAST ONLY)(CPT=74177)    Result Date: 11/20/2024  CONCLUSION: Posterior to the anal sphincters there is a 2 x 4 x 4 cm fluid collection, concerning for perianal abscess.  No soft tissue emphysema.  No intrapelvic extension.  No bowel obstruction   Dictated by (CST): Heber Ballesteros MD on 11/20/2024 at 9:23 AM     Finalized by (CST): Heber Ballesteros MD on 11/20/2024 at 9:29 AM             Meds:      [Transfer Hold] potassium chloride  40 mEq Oral Once    piperacillin-tazobactam  4.5 g Intravenous Q8H         [Transfer Hold] acetaminophen    [Transfer Hold] polyethylene glycol (PEG 3350)    [Transfer Hold] sennosides    [Transfer Hold] bisacodyl    [Transfer Hold] fleet enema    [Transfer Hold] ondansetron    [Transfer Hold] prochlorperazine    [Transfer Hold] ibuprofen **OR** [Transfer Hold] ibuprofen    [Transfer Hold] morphINE **OR** [Transfer Hold] morphINE **OR** [Transfer Hold] morphINE    [Transfer Hold] oxyCODONE **OR** [Transfer Hold] oxyCODONE    [Transfer Hold] HYDROmorphone **OR** [Transfer Hold] HYDROmorphone    [Transfer Hold] acetaminophen

## 2024-11-21 NOTE — PAYOR COMM NOTE
ADMISSION REVIEW     Payor: Levine, Susan. \Hospital Has a New Name and Outlook.\"" RESOURCES CHOICE PLUS  Subscriber #:  [id number]  Authorization Number: [id number]    Admit date: 11/20/24  Admit time: 10:56 AM       REVIEW DOCUMENTATION:     ED Provider Notes        ED Provider Notes signed by Emmanuel Taylor MD at 11/20/2024 10:13 AM       Author: Emmanuel Taylor MD Service: -- Author Type: Physician    Filed: 11/20/2024 10:13 AM Date of Service: 11/20/2024  7:14 AM Status: Signed    : Emmanuel Taylor MD (Physician)           Patient Seen in: [facility] Emergency Department      History     Chief Complaint   Patient presents with    Anal Problem     Stated Complaint: rectal pain    Subjective:   HPI      64-year-old male with history of perianal abscess post incision and drainage in 2021 rectal pain.  The patient reports pain over the past 4 days which has increased over the past 2 days.  He denies any anterior abdominal pain.  No fevers.  No nausea or vomiting.  No diarrhea.  He reports having a normal bowel movement yesterday.    Objective:     No pertinent past medical history.            History reviewed. No pertinent surgical history.             No pertinent social history.                Physical Exam     ED Triage Vitals [11/20/24 0621]   /78   Pulse 95   Resp 21   Temp 98.2 °F (36.8 °C)   Temp src    SpO2 95 %   O2 Device None (Room air)       Current Vitals:   Vital Signs  BP: 138/76  Pulse: 64  Resp: 16  Temp: 98.2 °F (36.8 °C)  MAP (mmHg): 91    Oxygen Therapy  SpO2: 98 %  O2 Device: None (Room air)        Physical Exam    General Appearance: alert, no distress  Eyes: pupils equal and round no pallor or injection  ENT, Mouth: mucous membranes moist  Respiratory: there are no retractions, lungs are clear to auscultation  Cardiovascular: regular rate and rhythm  Gastrointestinal:  abdomen is soft and non tender, no masses, bowel sounds normal  Rectal: No masses noted.  There is erythema, mild fluctuance, and  tenderness noted to the left perianal area near the anus at the 8 o'clock position drainage noted.  Neurological: Speech normal.  Moving extremities x 4.  Skin: warm and dry, no rashes.  Musculoskeletal: neck is supple non tender        Extremities are symmetrical, full range of motion  Psychiatric: patient is oriented X 3, there is no agitation    DIFFERENTIAL DIAGNOSIS: After history and physical exam differential diagnosis was considered for perianal abscess or other        ED Course     Labs Reviewed   BASIC METABOLIC PANEL (8) - Abnormal; Notable for the following components:       Result Value    Glucose 101 (*)     All other components within normal limits   CBC WITH DIFFERENTIAL WITH PLATELET - Abnormal; Notable for the following components:    WBC 13.1 (*)     Neutrophil Absolute Prelim 9.10 (*)     All other components within normal limits   SCAN SLIDE   MD BLOOD SMEAR CONSULT   URINALYSIS WITH CULTURE REFLEX   AEROBIC BACTERIAL CULTURE   BLOOD CULTURE   BLOOD CULTURE                MDM      Procedure:    Abscess drainage:  The patient abscess was located perirectal area.  I obtained verbal consent from the patient to drain the abscess who was informed about the possibility of bleeding, pain and worsening of the condition. The abscess was incised with a scalpel and minimal to no purulent drainage was expressed.  The patient tolerated the procedure well.  The procedure was performed by myself.    Laboratory studies were then obtained and the patient was sent for a CT which shows a perianal abscess.  Plan to admit for surgical evaluation and IV antibiotics.  Communicated with Dr. Barba, general surgery.  Also discussed with Dr. Saucedo, hospitalist.  Also communicated with Dr. Barragan, infectious disease.  She recommends antibiotics as ordered.  Admission disposition: 11/20/2024 10:05 AM           Medical Decision Making      Disposition and Plan     Clinical Impression:  1. Perianal abscess          Disposition:  Admit  11/20/2024 10:05 am    Follow-up:  No follow-up provider specified.        Medications Prescribed:  There are no discharge medications for this patient.          Supplementary Documentation:         Hospital Problems       Present on Admission             ICD-10-CM Noted POA    * (Principal) Perianal abscess K61.0 11/20/2024 Unknown              Signed by Emmanuel Taylor MD on 11/20/2024 10:13 AM         MEDICATIONS ADMINISTERED IN LAST 1 DAY:  ibuprofen (Motrin) tab 600 mg       Date Action Dose Route User    11/20/2024 1556 Given 600 mg Oral LacieKati tanner RN          oxyCODONE immediate release tab 10 mg       Date Action Dose Route User    11/20/2024 1941 Given 10 mg Oral CurioMeenakshi RN          piperacillin-tazobactam (Zosyn) 4.5 g in dextrose 5% 100 mL IVPB-ADDV       Date Action Dose Route User    11/21/2024 0645 New Bag 4.5 g Intravenous CurMeenakshi bunch RN    11/20/2024 2159 New Bag 4.5 g Intravenous CurioMeenakshi RN    11/20/2024 1427 New Bag 4.5 g Intravenous LacieKati tanner RN            Vitals (last day)       Date/Time Temp Pulse Resp BP SpO2 Weight O2 Device O2 Flow Rate (L/min) [facility]    11/21/24 1307 98.3 °F (36.8 °C) 66 14 139/74 98 % -- None (Room air) -- DM    11/21/24 0754 98.3 °F (36.8 °C) 67 20 115/67 97 % -- None (Room air) -- MM    11/21/24 0500 -- -- -- -- -- 150 lb 6.4 oz (68.2 kg) -- --     11/21/24 0500 99.5 °F (37.5 °C) 63 20 112/56 97 % -- None (Room air) -- AS    11/20/24 1940 100.1 °F (37.8 °C) 85 20 121/62 97 % -- None (Room air) --     11/20/24 1747 99.7 °F (37.6 °C) 84 20 124/59 98 % -- None (Room air) -- MM    11/20/24 1105 98.2 °F (36.8 °C) 60 20 135/73 98 % 148 lb 3.2 oz (67.2 kg) None (Room air) -- MM    11/20/24 0945 -- 64 16 138/76 98 % -- None (Room air) -- SW    11/20/24 0800 -- 69 16 122/65 100 % -- None (Room air) -- SW    11/20/24 0621 98.2 °F (36.8 °C) 95 21 122/78 95 % 150 lb (68 kg) None (Room air) -- VJ           11/20/2024 H&P  Travon Saucedo MD  Physician  Specialty: HOSPITALIST     H&P     Addendum     Date of Service: 11/20/2024 10:00 AM     Expand All Collapse All[]Expand All by Default    HOSPITALIST HISTORY AND PHYSICAL      CC:       Chief Complaint   Patient presents with    Anal Problem         PCP: No primary care provider on file.> about to establish w Jean Benitez     History of Present Illness:   Patient is a 64 year old male with PMH sig for psoriasis not on medication, hx yumiko-anal abscess 2021 that was drained (tx at [place]) here w 3 days severe pain similar to 2021 episode. Progressively worsening- localized to perianal region no radiation denies fevers or other focal sx. No known hx DM, not on immunosuppressives does have a long standing hx psoriasis. Labs/ CT as noted below.        Past Medical History   No past medical history on file.      Past Surgical History   History reviewed. No pertinent surgical history.         ALL:  [Allergies]     [Allergies]  No Known Allergies     Scheduled Meds            Social History            Tobacco Use    Smoking status: Every Day       Types: Cigarettes    Smokeless tobacco: Not on file   Substance Use Topics    Alcohol use: Yes       Comment: On weekends.         Fam Hx  Family History   No family history on file.        Review of Systems  10 point Comprehensive ROS reviewed and negative except for what's stated above.      OBJECTIVE:  /76   Pulse 64   Temp 98.2 °F (36.8 °C)   Resp 16   Ht 5' 10.5\" (1.791 m)   Wt 150 lb (68 kg)   SpO2 98%   BMI 21.22 kg/m²      GEN: NAD, AAO  NECK: supple, no LAD  HEENT- sclera anti-icteric, OP- MMM  CV: rrr, +s1/s2, PMI non displaced  LUNGS: CTAB, normal resp effort  ABL soft, NT/ND, NABS, no HSC  EXT: no LE edema b/l , DP pulses 2+ b/l  Neuro: sensation intact, no focal deficits  SKIN- red patchy plaques on elbows/ knees, yumiko-anal erythema   PSYCH- normal mentation, normal affect        LABS:         Lab  Results   Component Value Date     WBC 13.1 11/20/2024     HGB 14.5 11/20/2024     HCT 41.7 11/20/2024     .0 11/20/2024     CREATSERUM 0.94 11/20/2024     BUN 17 11/20/2024      11/20/2024     K 4.2 11/20/2024      11/20/2024     CO2 27.0 11/20/2024      11/20/2024     CA 9.4 11/20/2024         Radiology: CT ABDOMEN+PELVIS(CONTRAST ONLY)(CPT=74177)     Result Date: 11/20/2024  PROCEDURE:         CT ABDOMEN + PELVIS (CONTRAST ONLY) (CPT=74177)  COMPARISON:      None.  INDICATIONS:        rectal pain  TECHNIQUE:   CT images of the abdomen and pelvis were obtained with non-ionic intravenous contrast material.  Automated exposure control for dose reduction was used. Adjustment of the mA and/or kV was done based on the patient's size. Use of iterative reconstruction technique for dose reduction was used.  Dose information is transmitted to the ACR (American College of Radiology) NRDR (National Radiology Data Registry) which includes the Dose Index Registry.  FINDINGS:   LIVER:          No enlargement, atrophy, abnormal density, or significant focal lesion.  BILIARY:     The gallbladder is present.  No intra- or extrahepatic biliary ductal dilatation. SPLEEN: No enlargement or focal lesion.  STOMACH:      No gastric obstruction.  Duodenum is unremarkable. PANCREAS:         No lesion, fluid collection, ductal dilatation, or atrophy.  ADRENALS:      No nodule or enlargement. KIDNEYS:    No enhancing renal lesion or hydroureteronephrosis. AORTA/VASCULAR:         Aortic atherosclerosis without aneurysm.  No dissection. RETROPERITONEUM:         No mass or enlarged adenopathy.  BOWEL/MESENTERY:      There is no small or large bowel obstruction. The terminal ileum and appendix (series 2, image 82) are within normal limits. There is scattered descending and sigmoid colonic diverticulosis. There is no free air, free fluid, or lymphadenopathy in the abdomen or pelvis. ABDOMINAL WALL: Curvilinear fluid  collection measuring approximately 23 x 37 x 39 mm posterior to the anorectal junction to the level of the external anal sphincter, coursing from the posterior 5 o'clock to the right 9 o'clock position.  No soft tissue emphysema. URINARY BLADDER:            No visible focal wall thickening, lesion or calculus.  PELVIC NODES:     No enlarged mass or adenopathy.   PELVIC ORGANS:         Prostate is enlarged measuring 55 mm in transverse diameter. BONES:             Mild-moderate degenerative endplate change and disc disease within the spine. LUNG BASES:  Linear bands of atelectasis/scar in the lung bases.  The visualized heart has normal size.  No visible coronary artery calcifications. OTHER:      Negative.           CONCLUSION:        Posterior to the anal sphincters there is a 2 x 4 x 4 cm fluid collection, concerning for perianal abscess.  No soft tissue emphysema.  No intrapelvic extension.  No bowel obstruction   Dictated by (CST): Heber Ballesteros MD on 11/20/2024 at 9:23 AM     Finalized by (CST): Heber Ballesteros MD on 11/20/2024 at 9:29 AM                 ASSESSMENT / PLAN:     Perianal abscess 2021 here w recurrent abscess  Leukocytosis from above  - d/w Dr Taylor- surgery consulted requested ID as well in anticipation of possible need for ongoing iv abx after dc  - bcx sent  - sp attempted bedside I and D w/o drainaeage f/u surg recs- NPO aft 12 in anticipation of drainage   - started zosyn   - f/u cx data and CBC  - re risk factors- has psoriasis. Not on immunosuppressives denies other known pmhx but will check HgA1c. Also no prior colonscopy     HTN  - hx of this noted in [place] chart denies dx and not on medications     Nicotine use hx  - not current active issue     Insomnia  - can do melatonin in house     DVT ppx- SCDs ordered not chemical in case of need for surigcal drainage      Est dc date 2 midnights     Outpatient records or previous hospital records reviewed as detailed above.     [facility] hospitalist to  continue to follow patient while in house        ROYA Larios Hospitalist            [facility]  part of [facility] Infectious Disease  Report of Consultation           Robin Oconnor Patient Status:  Inpatient    1960 MRN [id number]   Location [facility] 1W Attending Travon Saucedo MD   Hosp Day # 0 PCP No primary care provider on file.      Date of Admission:  2024  Date of Consult:  2024     Reason for Consultation:  Perianal abscess     History of Present Illness:  Robin Oconnor is a a(n) 64 year old male being seen at your request regarding a perirectal/perianal abscess.  Patient does have a h/o same, admitted to [facility] for a horseshoe perirectal abscess that required I&D 2021.     Patient reports he has never had a colonoscopy.     Patient presented to the ED today with a c/o rectal pain worsening for several days PTA.  He had a normal BM yesterday.  Patient did have area identified in ED with attempted I&D.  Minimal purulent material resulted.  CT was done and demonstrated deeper abscess.  Patient was stated on IV zosyn and admitted for further evaluation by general surgery.      Patient denies F/C.  No N/V/D.  No systemic s/s of sepsis.  We are asked to see and assist.     History:  Past Medical History   No past medical history on file.     Past Surgical History   History reviewed. No pertinent surgical history.     Family History   No family history on file.      reports that he has been smoking cigarettes. He does not have any smokeless tobacco history on file. He reports current alcohol use. He reports that he does not use drugs.     Allergies:  [Allergies]    [Allergies]  No Known Allergies     Medications:    Current Hospital Medications      Current Facility-Administered Medications:     sodium chloride 0.9% infusion, 125 mL/hr, Intravenous, Continuous        Review of Systems:                 Constitutional:  No fevers,  chills, diaphoresis, weight changes.                HEENT:  No visual changes, oral ulcers, sore throat, difficulty swallowing.                Respiratory: Negative for cough, sputum, hemoptysis, chest pain, wheezing, dyspnea on exertion, or stridor.                Cardiovascular: Negative for chest pain, palpitations, irregular heart beats.                Gastrointestinal:  Rectal pain.                Genitourinary:  No dysuria, hematuria, urine urgency or frequency.                Integument/breast: Negative for rash, skin lesions, and pruritus.                Hematologic/lymphatic: Negative for easy bruising, bleeding, and lymphadenopathy.                Musculoskeletal: Negative for myalgias, arthralgias, muscle weakness.                Neurological: No focal neurologic deficits, seizures, tremors.                Psych:  No h/o anxiety, depression, other psych d/o.                Endocrine: No history of of diabetes, thyroid disorder.     Remainder of 12 point review of systems otherwise negative.     Vital signs in last 24 hours:  Patient Vitals for the past 24 hrs:    BP Temp Temp src Pulse Resp SpO2 Height Weight   11/20/24 1105 135/73 98.2 °F (36.8 °C) Oral 60 20 98 % 5' 10\" (1.778 m) 148 lb 3.2 oz (67.2 kg)   11/20/24 0945 138/76 -- -- 64 16 98 % -- --   11/20/24 0800 122/65 -- -- 69 16 100 % -- --   11/20/24 0621 122/78 98.2 °F (36.8 °C) -- 95 21 95 % 5' 10.5\" (1.791 m) 150 lb (68 kg)         Intake/Output:  No intake/output data recorded.     Physical Exam:                General: Awake, alert, non-tox and in NAD.                Head: Normocephalic, without obvious abnormality, atraumatic.                Eyes: Conjunctivae/corneas clear.  No scleral icterus.  No conjunctival                      hemorrhage.                Nose: Nares normal.                Throat:  Oropharynx clear, MMs moist.                Neck: Trachea ML, no masses.                Lungs: CTA b/l no rhonchi, rales, wheezes.                 Chest wall: No tenderness or deformity.                Heart: Regular rate and rhythm, normal S1S2, no murmurs.                Abdomen: Soft, NT/ND.  Bowel sounds present.  No organomegaly.                Extremity: No edema.                Skin: No rashes or lesions.                Neurological: No focal neurologic deficits.     Lab Data Review:        Lab Results   Component Value Date     WBC 13.1 11/20/2024     HGB 14.5 11/20/2024     HCT 41.7 11/20/2024     .0 11/20/2024     CREATSERUM 0.94 11/20/2024     BUN 17 11/20/2024      11/20/2024     K 4.2 11/20/2024      11/20/2024     CO2 27.0 11/20/2024      11/20/2024     CA 9.4 11/20/2024      Cultures:   Blood cultures obtained  Culture from rectal drainage obtained    Radiology:     CONCLUSION: Posterior to the anal sphincters there is a 2 x 4 x 4 cm fluid collection, concerning for perianal abscess.  No soft tissue emphysema.  No intrapelvic extension.  No bowel obstruction      Assessment and Plan:     Complicated perianal abscess in this patient who presented with rectal pain and a h/o similar presentation in 2021 at [facility]  - s/p attempted I&D in the ED, minimal purulent material drained  - CT with 2x4x4 fluid collection, general surgery to see  - Blood cultures obtained  - Abscess cultures obtained  - IV zosyn started     2.  H/o horseshoe perirectal abscess in 2021 which required surgical drainage 5/8/21 at [facility]  - Patient has never had a colonoscopy     3.  H/o psoriasis  - At risk, theoretically, for other autoimmune processes ?Crohn's disease, etc.     4.  Disposition - inpatient.  Continue IV zosyn and f/u all cultures.  Awaiting surgical input/intervention.  Final choice, route, and duration of antibiotics to be determined pending clinical course.  Last time he was able to step down to p.o. Rx at discharge.  Will need a colonoscopy after acute infection resolves.  Will follow.     Madelyn Barragan DO, [facility] Infectious [facility]

## 2024-11-21 NOTE — DISCHARGE INSTRUCTIONS
POST-OPERATIVE INSTRUCTIONS FOR ANORECTAL SURGERY    [facility] THE FOLLOWING FROM THE [facility]    PAIN  MEDICATION - A narcotic pain prescription may be provided, if not, ibuprofen (Advil)/acetaminophen (Tylenol) can be used to control pain and can be less constipating.  DO NOT exceed 4000 mg acetaminophen in 24 hours or 3200 mg ibuprofen in 24 hours.    METAMUCIL or similar fiber supplement is recommended on a daily basis.    COLACE or MIRALAX is recommended on a daily basis for 2 weeks to avoid hard bowel movements if taking prescription narcotics    SPECIAL INSTRUCTIONS    Remove the external gauze later in the day or during your first shower/bath.    On occasion a dissolvable foam (Gelfoam™) or gauze (Surgicel™) is used in the anal canal. This material will pass spontaneously often turning brown in color. Flush it down the toilet.    Avoiding straining or sitting on the toilet for long periods of time or heavy lifting especially the first day after surgery.  The increased pressure can aggravate swelling and bleeding.    Slight bleeding and drainage is usual after this procedure.  Report “excessive” bleeding or passage of clots to the office.  Use non-cotton gauze, sanitary pads or minipads as needed for bleeding and drainage.     Warm showers or baths are recommended 2 to 3 times per day or as needed in the post- operative period for discomfort and to keep area clean.    You can purchase a hand-held shower sprayer, bidet, sitz bath, or squirt bottle to keep the tissues clean in the yumiko-anal area after bowel movements and as needed.    Avoid a hot shower immediately after surgery since the sedation used during procedure may precipitate light-headedness or fainting.    Resume your regular diet.                                                       Report severe constipation or diarrhea to the office.  Contact the office immediately if you are unable to urinate or if you have fever or chills.    Do Not Use enemas or  suppositories after surgery unless specifically instructed by the office.     Contact the office the following business day after surgery to inform us of your progress and to make your follow-up appointment.                                                                              Do not drive/operate heavy machinery while you are taking narcotic pain medication or if your pain is too severe to allow you to react appropriately.    A small amount of bloody drainage can occur for several days and sometime weeks depending on the nature and severity of the surgical procedure    Please call the office at [phone] if you have any questions or concerns. Thank you for the privilege to be part of your care team.       Ina Beasley MD  [facility] - General Surgery   [address] [place] [address]  p [phone]    You need to establish with a primary care provider to complete a colonoscopy once you have healed

## 2024-11-21 NOTE — PROGRESS NOTES
Just tell them [facility] you can call in with verbal order thx   [facility]  part of [facility]    Progress Note    Robin Oconnor Patient Status:  Inpatient    1960 MRN [id number]   Location [facility] PRE OP RECOVERY Attending John Olivares DO   Hosp Day # 1 PCP No primary care provider on file.     Assessment and Plan:     Robin is a 64 year old male with perianal abscess     - OR for REUA, I&D of perianal abscess  - NPO, mIVF     Subjective:   No complaints, pain well controlled. Reports no BM for several days.     Objective:   Patient Vitals for the past 24 hrs:   BP Temp Temp src Pulse Resp SpO2 Weight   24 1307 139/74 98.3 °F (36.8 °C) -- 66 14 98 % --   24 0754 115/67 98.3 °F (36.8 °C) Oral 67 20 97 % --   24 0500 112/56 99.5 °F (37.5 °C) Oral 63 20 97 % 150 lb 6.4 oz (68.2 kg)   24 1940 121/62 100.1 °F (37.8 °C) Oral 85 20 97 % --   24 1747 124/59 99.7 °F (37.6 °C) Oral 84 20 98 % --       Intake/Output                   24 0700 - 24 0659 (Not Admitted) 24 0700 - 24 0659 24 0700 - 24 0659       Intake    P.O.  --  980  --    P.O. -- 980 --    I.V.  --  887  --    I.V. -- 887 --    Total Intake -- 1867 --       Output    Urine  --  920  400    Urine -- 920 400    Total Output -- 920 400       Net I/O     -- 947 -400            Exam: AVSS  General: No acute distress. Alert and oriented x 3.  HEENT: Moist mucous membranes. Anicteric.   Neck: Supple, No JVD.   Respiratory: Nonlabored resp.  Cardiovascular: Regular rate.   Abdomen: Soft, NT, no rebound tnd, no guarding, nondistended.  No masses. Normal bowel sounds.    Neurologic: No focal neurological deficits.  Musculoskeletal: Full range of motion of all extremities. No calf tenderness. No swelling noted.  Integument: No lesions. No erythema.  Psychiatric: Appropriate mood and affect.    Results:     Lab Results   Component Value Date    WBC 16.1 (H) 2024    HGB 14.4 2024    HCT 41.3 2024    .0  11/21/2024    CREATSERUM 0.90 11/21/2024    BUN 15 11/21/2024     11/21/2024    K 3.8 11/21/2024     11/21/2024    CO2 26.0 11/21/2024    GLU 95 11/21/2024    CA 9.1 11/21/2024       CT ABDOMEN+PELVIS(CONTRAST ONLY)(CPT=74177)    Result Date: 11/20/2024  CONCLUSION: Posterior to the anal sphincters there is a 2 x 4 x 4 cm fluid collection, concerning for perianal abscess.  No soft tissue emphysema.  No intrapelvic extension.  No bowel obstruction   Dictated by (CST): Heber Ballesteros MD on 11/20/2024 at 9:23 AM     Finalized by (CST): Heber Ballesteros MD on 11/20/2024 at 9:29 AM                   Ina Beasley MD  11/21/2024

## 2024-11-21 NOTE — ANESTHESIA PREPROCEDURE EVALUATION
Anesthesia PreOp Note    HPI:     Robin Oconnor is a 64 year old male who presents for preoperative consultation requested by: Ina Beasley MD    Date of Surgery: 11/20/2024 - 11/21/2024    Procedure(s):  RECTAL EXAM UNDER ANESTHESIA AND INCISION AND DRAINAGE OF PERIANAL ABSCESS  ANAL ABSCESS IRRIGATION & DEBRIDEMENT  Indication: Abscess, perianal [K61.0]    Relevant Problems   No relevant active problems       NPO:  Last Liquid Consumption Date: 11/21/24  Last Liquid Consumption Time: 0001  Last Solid Consumption Date: 11/21/24  Last Solid Consumption Time: 0001  Last Liquid Consumption Date: 11/21/24          History Review:  Patient Active Problem List    Diagnosis Date Noted    Perianal abscess 11/20/2024       Past Medical History:    Perianal abscess    Psoriasis       History reviewed. No pertinent surgical history.    Prescriptions Prior to Admission[1]  Current Medications and Prescriptions Ordered in Epic[2]    Allergies[3]    Family History   Problem Relation Age of Onset    No Known Problems Father     No Known Problems Mother      Social History     Socioeconomic History    Marital status: Single   Tobacco Use    Smoking status: Every Day     Types: Cigarettes   Vaping Use    Vaping status: Never Used   Substance and Sexual Activity    Alcohol use: Yes     Comment: On weekends.    Drug use: Never       Available pre-op labs reviewed.  Lab Results   Component Value Date    WBC 16.1 (H) 11/21/2024    RBC 4.62 11/21/2024    HGB 14.4 11/21/2024    HCT 41.3 11/21/2024    MCV 89.4 11/21/2024    MCH 31.2 11/21/2024    MCHC 34.9 11/21/2024    RDW 13.2 11/21/2024    .0 11/21/2024     Lab Results   Component Value Date     11/21/2024    K 3.8 11/21/2024     11/21/2024    CO2 26.0 11/21/2024    BUN 15 11/21/2024    CREATSERUM 0.90 11/21/2024    GLU 95 11/21/2024    CA 9.1 11/21/2024          Vital Signs:  Body mass index is 21.58 kg/m².   height is 1.778 m (5' 10\") and weight is 68.2 kg (150 lb  6.4 oz). His temperature is 98.3 °F (36.8 °C). His blood pressure is 139/74 and his pulse is 66. His respiration is 14 and oxygen saturation is 98%.   Vitals:    11/20/24 1940 11/21/24 0500 11/21/24 0754 11/21/24 1307   BP: 121/62 112/56 115/67 139/74   Pulse: 85 63 67 66   Resp: 20 20 20 14   Temp: 100.1 °F (37.8 °C) 99.5 °F (37.5 °C) 98.3 °F (36.8 °C) 98.3 °F (36.8 °C)   TempSrc: Oral Oral Oral    SpO2: 97% 97% 97% 98%   Weight:  68.2 kg (150 lb 6.4 oz)     Height:            Anesthesia Evaluation     Patient summary reviewed    Airway   Mallampati: II  TM distance: >3 FB  Neck ROM: full  Dental - Dentition appears grossly intact     Pulmonary - negative ROS and normal exam   Cardiovascular - normal exam    Neuro/Psych - negative ROS     GI/Hepatic/Renal - negative ROS     Endo/Other - negative ROS   Abdominal                  Anesthesia Plan:   ASA:  2  Plan:   General  Airway:  ETT  Post-op Pain Management: IV analgesics and Oral pain medication  Informed Consent Plan and Risks Discussed With:  Patient  Discussed plan with:  CRNA      I have informed Robin Oconnor and/or legal guardian or family member of the nature of the anesthetic plan, benefits, risks including possible dental damage if relevant, major complications, and any alternative forms of anesthetic management.   All of the patient's questions were answered to the best of my ability. The patient desires the anesthetic management as planned.  Magdalena Elkins MD  11/21/2024 2:02 PM  Present on Admission:  **None**           [1]   Medications Prior to Admission   Medication Sig Dispense Refill Last Dose/Taking    Multiple Vitamins-Minerals (ONE A DAY MEN 50 PLUS OR) Take 1 tablet by mouth daily with dinner.   11/19/2024 Evening    omega-3 fatty acids 1000 MG Oral Cap Take 1,000 mg by mouth daily with dinner.   11/19/2024 Evening    Esomeprazole Magnesium 20 MG Oral Capsule Delayed Release Take 1 capsule (20 mg total) by mouth every morning before breakfast.    11/20/2024 Morning    [facility] Natural Products (NEURIVA) Oral Chew Tab Chew 2 tablets by mouth at bedtime.   11/19/2024 Bedtime   [2]   Current Facility-Administered Medications Ordered in Epic   Medication Dose Route Frequency Provider Last Rate Last Admin    [Transfer Hold] potassium chloride (Klor-Con M20) tab 40 mEq  40 mEq Oral Once Long, John, DO        piperacillin-tazobactam (Zosyn) 4.5 g in dextrose 5% 100 mL IVPB-ADDV  4.5 g Intravenous Q8H Laurel, Madelyn Celia, DO 25 mL/hr at 11/21/24 0645 4.5 g at 11/21/24 0645    [Transfer Hold] acetaminophen (Tylenol Extra Strength) tab 500 mg  500 mg Oral Q4H PRN Travon Saucedo MD        [Transfer Hold] polyethylene glycol (PEG 3350) (Miralax) 17 g oral packet 17 g  17 g Oral Daily PRN Travon Saucedo MD        [Transfer Hold] sennosides (Senokot) tab 17.2 mg  17.2 mg Oral Nightly PRN Travon Saucedo MD        [Transfer Hold] bisacodyl (Dulcolax) 10 MG rectal suppository 10 mg  10 mg Rectal Daily PRN Travon Saucedo MD        [Transfer Hold] fleet enema (Fleet) rectal enema 133 mL  1 enema Rectal Once PRN Travon Saucedo MD        [Transfer Hold] ondansetron (Zofran) 4 MG/2ML injection 4 mg  4 mg Intravenous Q6H PRTravon Arenas MD        [Transfer Hold] prochlorperazine (Compazine) 10 MG/2ML injection 5 mg  5 mg Intravenous Q8H PRTravon Arenas MD        [Transfer Hold] ibuprofen (Motrin) tab 400 mg  400 mg Oral Q6H PRTravon Arenas MD        Or    [Transfer Hold] ibuprofen (Motrin) tab 600 mg  600 mg Oral Q6H PRTravon Arenas MD   600 mg at 11/20/24 1556    [Transfer Hold] morphINE PF 2 MG/ML injection 0.5 mg  0.5 mg Intravenous Q2H PRN Travon Saucedo MD        Or    [Transfer Hold] morphINE PF 2 MG/ML injection 1 mg  1 mg Intravenous Q2H PRN Travon Saucedo MD        Or    [Transfer Hold] morphINE PF 2 MG/ML injection 2 mg  2 mg Intravenous Q2H PRN Travon Saucedo MD        [Transfer Hold] oxyCODONE immediate release tab 5 mg  5 mg Oral Q4H PRN Diec,  JUANA Curiel        Or    [Transfer Hold] oxyCODONE immediate release tab 10 mg  10 mg Oral Q4H PRN Veena Carpio PA   10 mg at 11/20/24 1941    [Transfer Hold] HYDROmorphone (Dilaudid) 1 MG/ML injection 0.4 mg  0.4 mg Intravenous Q2H PRN Diepierre, JUANA Curiel        Or    [Transfer Hold] HYDROmorphone (Dilaudid) 1 MG/ML injection 0.8 mg  0.8 mg Intravenous Q2H PRN Diepierre, JUANA Curiel        [Transfer Hold] acetaminophen (Tylenol Extra Strength) tab 1,000 mg  1,000 mg Oral Q6H PRN Diepierre, JUANA Curiel         No current Epic-ordered outpatient medications on file.   [3] No Known Allergies

## 2024-11-21 NOTE — ANESTHESIA POSTPROCEDURE EVALUATION
Patient: Robin Oconnor    Procedure Summary       Date: 11/21/24 Room / Location: [facility] MAIN OR  / [facility] MAIN OR    Anesthesia Start: 1410 Anesthesia Stop: 1504    Procedures:       RECTAL EXAM UNDER ANESTHESIA AND INCISION AND DRAINAGE OF PERIANAL ABSCESS, AND PERIANAL BIOPSIES      ANAL ABSCESS IRRIGATION & DEBRIDEMENT Diagnosis:       Abscess, perianal      (Abscess, perianal [K61.0])    Surgeons: Ina Beasley MD Anesthesiologist: Magdalena Elkins MD    Anesthesia Type: general ASA Status: 2            Anesthesia Type: general    Vitals Value Taken Time   /66 11/21/24 1503   Temp 98.3 11/21/24 1505   Pulse 67 11/21/24 1504   Resp 19 11/21/24 1504   SpO2 100 % 11/21/24 1504   Vitals shown include unfiled device data.    [facility] AN Post Evaluation:   Patient Evaluated in PACU  Patient Participation: complete - patient participated  Level of Consciousness: awake and alert  Pain Score: 0  Pain Management: adequate  Airway Patency:patent  Dental exam unchanged from preop  Yes    Nausea/Vomiting: none  Cardiovascular Status: acceptable  Respiratory Status: acceptable  Postoperative Hydration acceptable      Sarah Boecker, CRNA  11/21/2024 3:05 PM

## 2024-11-21 NOTE — PLAN OF CARE
Problem: Patient Centered Care  Goal: Patient preferences are identified and integrated in the patient's plan of care  Description: Interventions:  - What would you like us to know as we care for you? Lives at home  - Provide timely, complete, and accurate information to patient/family  - Incorporate patient and family knowledge, values, beliefs, and cultural backgrounds into the planning and delivery of care  - Encourage patient/family to participate in care and decision-making at the level they choose  - Honor patient and family perspectives and choices  Outcome: Progressing     Problem: Patient/Family Goals  Goal: Patient/Family Long Term Goal  Description: Patient's Long Term Goal: go home    Interventions:  - surgeon on cinsult  - IV antibiotics  -IV fluids  - monitor vital signs  - monitor lab results  - See additional Care Plan goals for specific interventions  Outcome: Progressing  Goal: Patient/Family Short Term Goal  Description: Patient's Short Term Goal: \"figure out what is wrong\"    Interventions:   - IV antibiotics  - IV  fluids  - surgery on consult  -NPO as ordered  - See additional Care Plan goals for specific interventions  Outcome: Progressing   Oxycodone and Motrin given for pain control. Patient NPO and scheduled for I&D of abscess this morning. VSS. Will continue to monitor patient.

## 2024-11-21 NOTE — PROGRESS NOTES
[facility]  part of [facility] Infectious Disease  Progress Note    Robin Oconnor Patient Status:  Inpatient    1960 MRN [id number]   Location [facility] 1W Attending John Olivares DO   Hosp Day # 1 PCP No primary care provider on file.     Subjective:  Patient seen/examined.  Up in bed in NAD.  Plans for OR I&D of this abscess today.     Objective:  Blood pressure 115/67, pulse 67, temperature 98.3 °F (36.8 °C), temperature source Oral, resp. rate 20, height 5' 10\" (1.778 m), weight 150 lb 6.4 oz (68.2 kg), SpO2 97%.    Intake/Output:    Intake/Output Summary (Last 24 hours) at 2024 1245  Last data filed at 2024 1115  Gross per 24 hour   Intake 1867 ml   Output 1320 ml   Net 547 ml       Physical Exam:  General: Awake, alert, non-tox, NAD.  HEENT:  Oropharynx clear, trachea ML.  Heart: RRR S1S2 no murmurs.  Lungs: Essentially CTA b/l, no rhonchi, rales, wheezes.  Abdomen: Soft, NT/ND.  BS present.  No organomegaly.  Extremity: No edema.  Neurological: No focal deficits.  Derm:  Warm, dry, free from rashes.    Lab Data Review:  Lab Results   Component Value Date    WBC 16.1 2024    HGB 14.4 2024    HCT 41.3 2024    .0 2024    CREATSERUM 0.90 2024    BUN 15 2024     2024    K 3.8 2024     2024    CO2 26.0 2024    GLU 95 2024    CA 9.1 2024        Cultures:   Blood cultures negative thus far  Culture from rectal drainage with e.coli and klebsiella thus far    OR cultures to be obtained    Radiology:     CONCLUSION: Posterior to the anal sphincters there is a 2 x 4 x 4 cm fluid collection, concerning for perianal abscess.  No soft tissue emphysema.  No intrapelvic extension.  No bowel obstruction      Assessment and Plan:     Complicated perianal abscess in this patient who presented with rectal pain and a h/o similar presentation in  at [facility]  - s/p  attempted I&D in the ED, minimal purulent material drained  - CT with 2x4x4 fluid collection, plans for OR today  - Blood cultures negative thus far  - Abscess cultures with e.coli and klebsiella thus far  - IV zosyn ongoing    2.  Leukocytosis due to the above  - At 16K today, will trend     3.  H/o horseshoe perirectal abscess in 2021 which required surgical drainage 5/8/21 at [facility]  - Patient has never had a colonoscopy     4.  H/o psoriasis  - At risk, theoretically, for other autoimmune processes ?Crohn's disease, etc.      5.  Disposition - inpatient.  Continue IV zosyn and f/u all cultures.  Awaiting surgical intervention.  Final choice, route, and duration of antibiotics to be determined pending clinical course.  Last time he was able to step down to p.o. Rx at discharge.  Will need a colonoscopy after acute infection resolves.  Will follow.    Madelyn Barragan DO, [facility] Infectious Disease  [phone]    11/21/2024  12:45 PM

## 2024-11-21 NOTE — OPERATIVE REPORT
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  I explained the risk, benefits, expected outcome, and alternatives to the procedure.  He indicates understanding and wishes to proceed with surgery.     DESCRIPTION OF PROCEDURE:    The patient was brought to the operating room and placed in supine position. General endotracheal anesthesia was induced. He was positioned in lithotomy position. A surgical time out was performed and all in the room were in agreement to proceed.    The perianal area was prepped with Betadine.  A pudendal block and a perianal block was performed with quarter percent Marcaine with epinephrine.  We began with a digital rectal examination which was negative for any obstructing masses.  Using the Hill-Huerta retractor I identified a fistula opening at the posterior anal opening and at the right lateral anal opening.  He also had small internal hemorrhoids.  Hydrogen peroxide were instilled into the two perianal openings and there were no internal openings identified.  The fistula tracts were probed with hemostats and there was minimal remaining pus.  There were no loculations.  Given the recurrent nature of his abscess and no prior  colonoscopy I decided to perform biopsies of the fistula openings. Both the right lateral and posterior fistula opening and the base were biopsied and sent for permanent pathology to rule out Crohn's disease.  They were then packed with iodoform gauze.      All instrument and sponge counts were correct at the end of the case. I was present and scrubbed for the entire operation. The patient tolerated the procedure well, was extubated, and sent to the recovery room in stable condition.           Ina Beasley MD  [facility] - General Surgery   [address] [place] [address]  p [phone]

## 2024-11-22 LAB
ANION GAP SERPL CALC-SCNC: 9 MMOL/L (ref 0–18)
BASOPHILS # BLD AUTO: 0.04 X10(3) UL (ref 0–0.2)
BASOPHILS NFR BLD AUTO: 0.2 %
BUN BLD-MCNC: 16 MG/DL (ref 9–23)
BUN/CREAT SERPL: 17.6 (ref 10–20)
CALCIUM BLD-MCNC: 9.4 MG/DL (ref 8.7–10.4)
CHLORIDE SERPL-SCNC: 105 MMOL/L (ref 98–112)
CO2 SERPL-SCNC: 25 MMOL/L (ref 21–32)
CREAT BLD-MCNC: 0.91 MG/DL
DEPRECATED RDW RBC AUTO: 43.7 FL (ref 35.1–46.3)
EGFRCR SERPLBLD CKD-EPI 2021: 95 ML/MIN/1.73M2 (ref 60–?)
EOSINOPHIL # BLD AUTO: 0.02 X10(3) UL (ref 0–0.7)
EOSINOPHIL NFR BLD AUTO: 0.1 %
ERYTHROCYTE [DISTWIDTH] IN BLOOD BY AUTOMATED COUNT: 12.9 % (ref 11–15)
GLUCOSE BLD-MCNC: 97 MG/DL (ref 70–99)
HCT VFR BLD AUTO: 43.7 %
HGB BLD-MCNC: 15.3 G/DL
IMM GRANULOCYTES # BLD AUTO: 0.11 X10(3) UL (ref 0–1)
IMM GRANULOCYTES NFR BLD: 0.6 %
LYMPHOCYTES # BLD AUTO: 1.65 X10(3) UL (ref 1–4)
LYMPHOCYTES NFR BLD AUTO: 8.8 %
MCH RBC QN AUTO: 31.9 PG (ref 26–34)
MCHC RBC AUTO-ENTMCNC: 35 G/DL (ref 31–37)
MCV RBC AUTO: 91.2 FL
MONOCYTES # BLD AUTO: 1.73 X10(3) UL (ref 0.1–1)
MONOCYTES NFR BLD AUTO: 9.2 %
NEUTROPHILS # BLD AUTO: 15.21 X10 (3) UL (ref 1.5–7.7)
NEUTROPHILS # BLD AUTO: 15.21 X10(3) UL (ref 1.5–7.7)
NEUTROPHILS NFR BLD AUTO: 81.1 %
OSMOLALITY SERPL CALC.SUM OF ELEC: 289 MOSM/KG (ref 275–295)
PLATELET # BLD AUTO: 347 10(3)UL (ref 150–450)
POTASSIUM SERPL-SCNC: 4.3 MMOL/L (ref 3.5–5.1)
POTASSIUM SERPL-SCNC: 4.3 MMOL/L (ref 3.5–5.1)
RBC # BLD AUTO: 4.79 X10(6)UL
SODIUM SERPL-SCNC: 139 MMOL/L (ref 136–145)
WBC # BLD AUTO: 18.8 X10(3) UL (ref 4–11)

## 2024-11-22 RX ORDER — HEPARIN SODIUM 5000 [USP'U]/ML
5000 INJECTION, SOLUTION INTRAVENOUS; SUBCUTANEOUS EVERY 8 HOURS SCHEDULED
Status: DISCONTINUED | OUTPATIENT
Start: 2024-11-22 | End: 2024-11-23

## 2024-11-22 NOTE — PAYOR COMM NOTE
--------------  11/21- 22 CONTINUED STAY REVIEW    Payor: [facility] [employer] CHO[employer]  Subscriber #:  [id number]  Authorization Number: [id number]    11/21:     ID:    Up in bed in NAD.  Plans for OR I&D of this abscess today.      Objective:  Blood pressure 115/67, pulse 67, temperature 98.3 °F (36.8 °C), temperature source Oral, resp. rate 20, height 5' 10\" (1.778 m), weight 150 lb 6.4 oz (68.2 kg), SpO2 97%.      Physical Exam:  General: Awake, alert, non-tox, NAD.  HEENT:  Oropharynx clear, trachea ML.  Heart: RRR S1S2 no murmurs.  Lungs: Essentially CTA b/l, no rhonchi, rales, wheezes.  Abdomen: Soft, NT/ND.  BS present.  No organomegaly.  Extremity: No edema.  Neurological: No focal deficits.  Derm:  Warm, dry, free from rashes.     Lab Data Review:        Lab Results   Component Value Date     WBC 16.1 11/21/2024     HGB 14.4 11/21/2024     HCT 41.3 11/21/2024     .0 11/21/2024     CREATSERUM 0.90 11/21/2024     BUN 15 11/21/2024      11/21/2024     K 3.8 11/21/2024      11/21/2024     CO2 26.0 11/21/2024     GLU 95 11/21/2024     CA 9.1 11/21/2024        Cultures:   Blood cultures negative thus far  Culture from rectal drainage with e.coli and klebsiella thus far     OR cultures to be obtained    Radiology:     CONCLUSION: Posterior to the anal sphincters there is a 2 x 4 x 4 cm fluid collection, concerning for perianal abscess.  No soft tissue emphysema.  No intrapelvic extension.  No bowel obstruction      Assessment and Plan:     Complicated perianal abscess in this patient who presented with rectal pain and a h/o similar presentation in 2021 at [facility]  - s/p attempted I&D in the ED, minimal purulent material drained  - CT with 2x4x4 fluid collection, plans for OR today  - Blood cultures negative thus far  - Abscess cultures with e.coli and klebsiella thus far  - IV zosyn ongoing     2.  Leukocytosis due to the above  - At 16K today, will trend     3.  H/o horseshoe perirectal  abscess in  which required surgical drainage 21 at [facility]  - Patient has never had a colonoscopy     4.  H/o psoriasis  - At risk, theoretically, for other autoimmune processes ?Crohn's disease, etc.        5.  Disposition - inpatient.  Continue IV zosyn and f/u all cultures.  Awaiting surgical intervention.  Final choice, route, and duration of antibiotics to be determined pending clinical course.  Last time he was able to step down to p.o. Rx at discharge.  Will need a colonoscopy after acute infection resolves.  Will follow.       HOSPITALIST:       Assessment/Plan:      Principal Problem:    Perianal abscess     64 M with PMH psoriasis who presents with recurrent perianal abscess     Perianal abscess  here w recurrent abscess   -Appreciate general surgery consultation planning I&D in the OR today  -Continue Zosyn follow-up cultures  -ID service is following appreciate consultation  -A1c 5.5   -Trend WBC, leukocytosis is slightly increased today continue to trend     2. HTN?  -Not taking any medications monitor for now     FN:  IVF:None  Diet:NPO pending OR      Prophylaxis:   DVT: Hold pending OR        Subjective:      Feels well, rectal pain is improved, no fevers or chills, no chest pains, no SOB      OBJECTIVE:     Blood pressure 139/74, pulse 66, temperature 98.3 °F (36.8 °C), resp. rate 14, height 5' 10\" (1.778 m), weight 150 lb 6.4 oz (68.2 kg), SpO2 98%.     Temp:  [98.3 °F (36.8 °C)-100.1 °F (37.8 °C)] 98.3 °F (36.8 °C)  Pulse:  [63-85] 66  Resp:  [14-20] 14  BP: (112-139)/(56-74) 139/74  SpO2:  [97 %-98 %] 98 %      SURGERY:    OPERATIVE NOTE     PATIENT NAME: Robin Oconnor                   :  1960                           MRN:  [id number]  ATTENDING PHYSICIAN:  John Olivares DO                  PROCEDURE DATE:  2024         PREOPERATIVE DIAGNOSIS: Perianal abscess     POSTOPERATIVE DIAGNOSIS: Perianal abscess with chronic sinus tracts and fistula opening at the right lateral  and posterior positions      SURGEON: Ina Beasley MD     ASSISTANT: Marbin Victoria PA-C      OPERATION:   Rectal exam under anesthesia  Incision and drainage of posterior intersphincteric abscess  Biopsy of posterior and right lateral perianal fistula opening      ANESTHESIA: General     ESTIMATED BLOOD LOSS:  2 ml       COMPLICATIONS: none      SPECIMENS: posterior and right lateral perianal fistula opening     INDICATIONS: The patient is a 64 year old year old male with history of above preop diagnosis.  I explained the risk, benefits, expected outcome, and alternatives to the procedure.  He indicates understanding and wishes to proceed with surgery.      DESCRIPTION OF PROCEDURE:    The patient was brought to the operating room and placed in supine position. General endotracheal anesthesia was induced. He was positioned in lithotomy position. A surgical time out was performed and all in the room were in agreement to proceed.     The perianal area was prepped with Betadine.  A pudendal block and a perianal block was performed with quarter percent Marcaine with epinephrine.  We began with a digital rectal examination which was negative for any obstructing masses.  Using the Hill-Huerta retractor I identified a fistula opening at the posterior anal opening and at the right lateral anal opening.  He also had small internal hemorrhoids.  Hydrogen peroxide were instilled into the two perianal openings and there were no internal openings identified.  The fistula tracts were probed with hemostats and there was minimal remaining pus.  There were no loculations.  Given the recurrent nature of his abscess and no prior colonoscopy I decided to perform biopsies of the fistula openings. Both the right lateral and posterior fistula opening and the base were biopsied and sent for permanent pathology to rule out Crohn's disease.  They were then packed with iodoform gauze.       11/22:    SURGERY:      Patient resting in bed. He  feels much improved since admission. He denies fever or chills. He does not have anal pain, mild anal discomfort.      Objective/Physical Exam:  General: Alert, orientated x3.  Cooperative.  No apparent distress.  Vital Signs:  Blood pressure 133/67, pulse 51, temperature 98.7 °F (37.1 °C), temperature source Oral, resp. rate 18, height 5' 10\" (1.778 m), weight 150 lb 6.4 oz (68.2 kg), SpO2 99%.     Lungs: No respiratory distress.  Cardiac: Regular rate and rhythm.   Abdomen:  Soft, not distended, not tender, with no rebound or guarding.  No peritoneal signs.   Extremities:  No lower extremity edema noted.    Surgical site: packing in place, dressing with mild bleeding        Scheduled Medications    piperacillin-tazobactam  4.5 g Intravenous Q8H          Labs:        Lab Results   Component Value Date     WBC 18.8 11/22/2024     HGB 15.3 11/22/2024     HCT 43.7 11/22/2024     .0 11/22/2024      Component Value Date      11/22/2024     K 4.3 11/22/2024     K 4.3 11/22/2024      11/22/2024     CO2 25.0 11/22/2024     BUN 16 11/22/2024     CREATSERUM 0.91 11/22/2024     GLU 97 11/22/2024     CA 9.4 11/22/2024       Assessment      Patient Active Problem List   Diagnosis    Perianal abscess      POD 1: Rectal exam under anesthesia, Incision and drainage of posterior intersphincteric abscess, Biopsy of posterior and right lateral perianal fistula opening         Plan:  Patient doing well post op.   Stable for discharge once cleared by other specialties. Anticipate home on antibiotic course.   Patient will follow up in clinic in 2 weeks, sooner if needed.  Surgery will sign off, please call with questions.       MEDICATIONS ADMINISTERED IN LAST 1 DAY:    lactated ringers infusion       Date Action Dose Route User    11/21/2024 1410 New Bag (none) Intravenous Boecker, Sarah, CRNA          lactated ringers infusion       Date Action Dose Route User    11/21/2024 1511 Restarted (none) Intravenous Dionicio  JULIEN Clarke       oxyCODONE immediate release tab 10 mg       Date Action Dose Route User    11/21/2024 1621 Given 10 mg Oral Yanira Prieto RN       piperacillin-tazobactam (Zosyn) 4.5 g in dextrose 5% 100 mL IVPB-ADDV       Date Action Dose Route User    11/22/2024 0627 New Bag 4.5 g Intravenous Meenakshi Rodas RN    11/21/2024 2159 New Bag 4.5 g Intravenous Meenakshi Rodas RN    11/21/2024 1421 Bolus 4.5 g Intravenous Boecker, Sarah, CRNA

## 2024-11-22 NOTE — PROGRESS NOTES
[facility] Hospitalist Progress Note     CC: Hospital Follow up    PCP: No primary care provider on file.       Assessment/Plan:     Principal Problem:    Perianal abscess    64 M with PMH psoriasis who presents with recurrent perianal abscess    Perianal abscess 2021 here w recurrent abscess   - Appreciate general surgery consultation planning I&D in the OR 11/22  - Continue Zosyn follow-up cultures, E coli , Klebsiella   - ID service is following appreciate consultation, given increased WBC will watch overnight, discussed with Dr. Barragan   - A1c 5.5   - Trend WBC    2. Elevated blood pressure, resolved     FN:  IVF:None  Diet:General     Prophylaxis:   DVT: Heparin TID since we will keep overnight     Dispo: Monitor inpatient   Code status: FULL CODE     Questions/concerns were discussed with patient and/or family by bedside.    Thank You,  John Olivares DO    Hospitalist with [facility]     Subjective:     Feels well, no fevers or chills, rectal pain is Improved, no fevers or chills, no chest pains     OBJECTIVE:    Blood pressure 133/67, pulse 51, temperature 98.7 °F (37.1 °C), temperature source Oral, resp. rate 18, height 5' 10\" (1.778 m), weight 150 lb 6.4 oz (68.2 kg), SpO2 99%.    Temp:  [97.8 °F (36.6 °C)-98.7 °F (37.1 °C)] 98.7 °F (37.1 °C)  Pulse:  [51-69] 51  Resp:  [11-18] 18  BP: (116-139)/(66-96) 133/67  SpO2:  [97 %-100 %] 99 %      Intake/Output:    Intake/Output Summary (Last 24 hours) at 11/22/2024 1229  Last data filed at 11/22/2024 1100  Gross per 24 hour   Intake 2112 ml   Output 2105 ml   Net 7 ml       Last 3 Weights   11/21/24 0500 150 lb 6.4 oz (68.2 kg)   11/20/24 1105 148 lb 3.2 oz (67.2 kg)   11/20/24 0621 150 lb (68 kg)       Exam   Gen: No acute distress, alert and oriented x 3  Pulm: Lungs clear, normal respiratory effort  CV: Heart with regular rate and rhythm  Abd: Abdomen soft, nontender, non-distended  MSK: no clubbing, no cyanosis        Data Review:        Labs:     Recent Labs   Lab 11/20/24  0803 11/21/24  0644 11/22/24  0648   RBC 4.52 4.62 4.79   HGB 14.5 14.4 15.3   HCT 41.7 41.3 43.7   MCV 92.3 89.4 91.2   MCH 32.1 31.2 31.9   MCHC 34.8 34.9 35.0   RDW 13.5 13.2 12.9   NEPRELIM 9.10* 11.92* 15.21*   WBC 13.1* 16.1* 18.8*   .0 324.0 347.0         Recent Labs   Lab 11/20/24  0803 11/21/24  0645 11/22/24  0648   * 95 97   BUN 17 15 16   CREATSERUM 0.94 0.90 0.91   EGFRCR 92 97 95   CA 9.4 9.1 9.4    137 139   K 4.2 3.8 4.3  4.3    104 105   CO2 27.0 26.0 25.0       No results for input(s): \"ALT\", \"AST\", \"ALB\", \"AMYLASE\", \"LIPASE\", \"LDH\" in the last 168 hours.    Invalid input(s): \"ALPHOS\", \"TBIL\", \"DBIL\", \"TPROT\"      Imaging:  CT ABDOMEN+PELVIS(CONTRAST ONLY)(CPT=74177)    Result Date: 11/20/2024  CONCLUSION: Posterior to the anal sphincters there is a 2 x 4 x 4 cm fluid collection, concerning for perianal abscess.  No soft tissue emphysema.  No intrapelvic extension.  No bowel obstruction   Dictated by (CST): Heber Ballesteros MD on 11/20/2024 at 9:23 AM     Finalized by (CST): Heber Ballesteros MD on 11/20/2024 at 9:29 AM             Meds:      piperacillin-tazobactam  4.5 g Intravenous Q8H         acetaminophen    polyethylene glycol (PEG 3350)    sennosides    bisacodyl    fleet enema    ondansetron    prochlorperazine    ibuprofen **OR** ibuprofen    morphINE **OR** morphINE **OR** morphINE    oxyCODONE **OR** oxyCODONE    HYDROmorphone **OR** HYDROmorphone    acetaminophen

## 2024-11-22 NOTE — PROGRESS NOTES
[facility]  Progress Note    Robin Oconnor Patient Status:  Inpatient    1960 MRN [id number]   Location [facility] 1W Attending John Olivares DO   Hosp Day # 2 PCP No primary care provider on file.     Subjective:  Patient resting in bed. He feels much improved since admission. He denies fever or chills. He does not have anal pain, mild anal discomfort.     Objective/Physical Exam:  General: Alert, orientated x3.  Cooperative.  No apparent distress.  Vital Signs:  Blood pressure 133/67, pulse 51, temperature 98.7 °F (37.1 °C), temperature source Oral, resp. rate 18, height 5' 10\" (1.778 m), weight 150 lb 6.4 oz (68.2 kg), SpO2 99%.  Wt Readings from Last 3 Encounters:   24 150 lb 6.4 oz (68.2 kg)     Lungs: No respiratory distress.  Cardiac: Regular rate and rhythm.   Abdomen:  Soft, not distended, not tender, with no rebound or guarding.  No peritoneal signs.   Extremities:  No lower extremity edema noted.    Surgical site: packing in place, dressing with mild bleeding      Intake/Output:    Intake/Output Summary (Last 24 hours) at 2024 0923  Last data filed at 2024 0600  Gross per 24 hour   Intake 1862 ml   Output 1505 ml   Net 357 ml     I/O last 3 completed shifts:  In: 3049 [P.O.:1440; I.V.:1509; IV PIGGYBACK:100]  Out: 2125 [Urine:2120; Blood:5]  No intake/output data recorded.    Medications:    piperacillin-tazobactam  4.5 g Intravenous Q8H       Labs:  Lab Results   Component Value Date    WBC 18.8 2024    HGB 15.3 2024    HCT 43.7 2024    .0 2024     Lab Results   Component Value Date     2024    K 4.3 2024    K 4.3 2024     2024    CO2 25.0 2024    BUN 16 2024    CREATSERUM 0.91 2024    GLU 97 2024    CA 9.4 2024     No results found for: \"PT\", \"INR\"      Assessment  Patient Active Problem List   Diagnosis    Perianal abscess     POD 1: Rectal exam under  anesthesia, Incision and drainage of posterior intersphincteric abscess, Biopsy of posterior and right lateral perianal fistula opening       Plan:  Patient doing well post op.   Stable for discharge once cleared by other specialties. Anticipate home on antibiotic course.   Patient will follow up in clinic in 2 weeks, sooner if needed.  Surgery will sign off, please call with questions.    Quality:  DVT Mechanical Prophylaxis:   SCDs,    DVT Pharmacologic Prophylaxis   Medication   None                Code Status: Full Code  Nava: No urinary catheter in place  Nava Duration (in days):   Central line:    JEFFRY:         JUANA Siddiqi  11/22/2024  9:23 AM

## 2024-11-22 NOTE — PLAN OF CARE
Problem: Patient Centered Care  Goal: Patient preferences are identified and integrated in the patient's plan of care  Description: Interventions:  - What would you like us to know as we care for you? Lives at home  - Provide timely, complete, and accurate information to patient/family  - Incorporate patient and family knowledge, values, beliefs, and cultural backgrounds into the planning and delivery of care  - Encourage patient/family to participate in care and decision-making at the level they choose  - Honor patient and family perspectives and choices  Outcome: Progressing     Problem: Patient/Family Goals  Goal: Patient/Family Long Term Goal  Description: Patient's Long Term Goal: go home    Interventions:  - surgeon on cinsult  - IV antibiotics  -IV fluids  - monitor vital signs  - monitor lab results  - See additional Care Plan goals for specific interventions  Outcome: Progressing  Goal: Patient/Family Short Term Goal  Description: Patient's Short Term Goal: \"figure out what is wrong\"    Interventions:   - IV antibiotics  - IV  fluids  - surgery on consult  -NPO as ordered  - See additional Care Plan goals for specific interventions  Outcome: Progressing   Patient s/p I&D of abscess yesterday. No complaints of pain. IV antibiotics. VSS. Will continue to monitor patient

## 2024-11-22 NOTE — PROGRESS NOTES
[facility]  part of [facility] Infectious Disease  Progress Note    Robin Oconnor Patient Status:  Inpatient    1960 MRN [id number]   Location [facility] 1W Attending John Olivares DO   Hosp Day # 2 PCP No primary care provider on file.     Subjective:  Patient seen/examined.  Underwent I&D of perirectal abscess 24 and tolerated well.  Sinus tracts and fistula was noted and biopsy was done.    Objective:  Blood pressure 133/67, pulse 51, temperature 98.7 °F (37.1 °C), temperature source Oral, resp. rate 18, height 5' 10\" (1.778 m), weight 150 lb 6.4 oz (68.2 kg), SpO2 99%.    Intake/Output:    Intake/Output Summary (Last 24 hours) at 2024 1115  Last data filed at 2024 0900  Gross per 24 hour   Intake 2112 ml   Output 1305 ml   Net 807 ml       Physical Exam:  General: Awake, alert, non-tox, NAD.  HEENT:  Oropharynx clear, trachea ML.  Heart: RRR S1S2 no murmurs.  Lungs: Essentially CTA b/l, no rhonchi, rales, wheezes.  Abdomen: Soft, NT/ND.  BS present.  No organomegaly.  Extremity: No edema.  Neurological: No focal deficits.  Derm:  Warm, dry, free from rashes.    Lab Data Review:  Lab Results   Component Value Date    WBC 18.8 2024    HGB 15.3 2024    HCT 43.7 2024    .0 2024    CREATSERUM 0.91 2024    BUN 16 2024     2024    K 4.3 2024    K 4.3 2024     2024    CO2 25.0 2024    GLU 97 2024    CA 9.4 2024        Cultures:   Blood cultures negative thus far  Culture from rectal drainage with e.coli and klebsiella thus far     OR cultures to be obtained    Radiology:     CONCLUSION: Posterior to the anal sphincters there is a 2 x 4 x 4 cm fluid collection, concerning for perianal abscess.  No soft tissue emphysema.  No intrapelvic extension.  No bowel obstruction      Assessment and Plan:     Complicated perianal abscess in this patient who  presented with rectal pain and a h/o similar presentation in 2021 at [facility]  - s/p attempted I&D in the ED, minimal purulent material drained  - CT with 2x4x4 fluid collection, s/p OR 11/21/24  - Blood cultures negative thus far  - Abscess cultures with e.coli and klebsiella thus far  - IV zosyn ongoing     2.  Leukocytosis due to the above  - At 16K today, will trend     3.  H/o horseshoe perirectal abscess in 2021 which required surgical drainage 5/8/21 at [facility]  - Patient has never had a colonoscopy     4.  H/o psoriasis  - At risk, theoretically, for other autoimmune processes ?Crohn's disease, etc.  - Sinus tracts and fistula noted in OR     5.  Disposition - stable post-op from ID.   Continue IV zosyn as Rx.    WBCs have trended up to 18K and I would like to see these trending down further prior to discharge.  We should be able to step down to augmentin 875mg p.o. BID x 10 days as soon as tomorrow.  Orders entered.  D/w patient and he is in agreement with this plan.  D/w Dr. Olivares as well.    Madelyn Barragan DO, [facility] Infectious Disease  [phone]    11/22/2024  11:15 AM

## 2024-11-23 VITALS
HEIGHT: 70 IN | OXYGEN SATURATION: 94 % | TEMPERATURE: 98 F | DIASTOLIC BLOOD PRESSURE: 62 MMHG | BODY MASS INDEX: 21.53 KG/M2 | RESPIRATION RATE: 18 BRPM | HEART RATE: 63 BPM | SYSTOLIC BLOOD PRESSURE: 116 MMHG | WEIGHT: 150.38 LBS

## 2024-11-23 LAB
ANION GAP SERPL CALC-SCNC: 9 MMOL/L (ref 0–18)
BASOPHILS # BLD AUTO: 0.06 X10(3) UL (ref 0–0.2)
BASOPHILS NFR BLD AUTO: 0.5 %
BUN BLD-MCNC: 16 MG/DL (ref 9–23)
BUN/CREAT SERPL: 14 (ref 10–20)
CALCIUM BLD-MCNC: 9.4 MG/DL (ref 8.7–10.4)
CHLORIDE SERPL-SCNC: 107 MMOL/L (ref 98–112)
CO2 SERPL-SCNC: 28 MMOL/L (ref 21–32)
CREAT BLD-MCNC: 1.14 MG/DL
DEPRECATED RDW RBC AUTO: 43.4 FL (ref 35.1–46.3)
EGFRCR SERPLBLD CKD-EPI 2021: 73 ML/MIN/1.73M2 (ref 60–?)
EOSINOPHIL # BLD AUTO: 0.24 X10(3) UL (ref 0–0.7)
EOSINOPHIL NFR BLD AUTO: 2.1 %
ERYTHROCYTE [DISTWIDTH] IN BLOOD BY AUTOMATED COUNT: 13.1 % (ref 11–15)
GLUCOSE BLD-MCNC: 99 MG/DL (ref 70–99)
HCT VFR BLD AUTO: 41.1 %
HGB BLD-MCNC: 14.6 G/DL
IMM GRANULOCYTES # BLD AUTO: 0.06 X10(3) UL (ref 0–1)
IMM GRANULOCYTES NFR BLD: 0.5 %
LYMPHOCYTES # BLD AUTO: 2.35 X10(3) UL (ref 1–4)
LYMPHOCYTES NFR BLD AUTO: 21 %
MCH RBC QN AUTO: 32.1 PG (ref 26–34)
MCHC RBC AUTO-ENTMCNC: 35.5 G/DL (ref 31–37)
MCV RBC AUTO: 90.3 FL
MONOCYTES # BLD AUTO: 1.23 X10(3) UL (ref 0.1–1)
MONOCYTES NFR BLD AUTO: 11 %
NEUTROPHILS # BLD AUTO: 7.25 X10 (3) UL (ref 1.5–7.7)
NEUTROPHILS # BLD AUTO: 7.25 X10(3) UL (ref 1.5–7.7)
NEUTROPHILS NFR BLD AUTO: 64.9 %
OSMOLALITY SERPL CALC.SUM OF ELEC: 299 MOSM/KG (ref 275–295)
PLATELET # BLD AUTO: 366 10(3)UL (ref 150–450)
POTASSIUM SERPL-SCNC: 4.4 MMOL/L (ref 3.5–5.1)
RBC # BLD AUTO: 4.55 X10(6)UL
SODIUM SERPL-SCNC: 144 MMOL/L (ref 136–145)
WBC # BLD AUTO: 11.2 X10(3) UL (ref 4–11)

## 2024-11-23 NOTE — PROGRESS NOTES
Acute bacterial conjunctivitis- treat both eyes with eye drops as ordered  Wash hands thoroughly  Sinus infection- due to duration of symptoms, treat with antibiotics  May want to take allergy medication such as Zyrtec or Claritin when working, due to environmental exposure  Conjunctivitis   AMBULATORY CARE:   Conjunctivitis,  or pink eye, is inflammation of your conjunctiva  The conjunctiva is a thin tissue that covers the front of your eye and the back of your eyelids  The conjunctiva helps protect your eye and keep it moist  Conjunctivitis may be caused by bacteria, allergies, or a virus  If your conjunctivitis is caused by bacteria, it may get better on its own in about 7 days  Viral conjunctivitis can last up to 3 weeks  Common symptoms may include any of the following: You will usually have symptoms in both eyes if your conjunctivitis is caused by allergies  You may also have other allergic symptoms, such as a rash or runny nose  Symptoms will usually start in 1 eye if your conjunctivitis is caused by a virus or bacteria  · Redness in the whites of your eye    · Itching in your eye or around your eye    · Feeling like there is something in your eye    · Watery or thick, sticky discharge    · Crusty eyelids when you wake up in the morning    · Burning, stinging, or swelling in your eye    · Pain when you see bright light  Seek care immediately if:   · You have worsening eye pain  · The swelling in your eye gets worse, even after treatment  · Your vision suddenly becomes worse or you cannot see at all  Contact your healthcare provider if:   · You develop a fever and ear pain  · You have tiny bumps or spots of blood on your eye  · You have questions or concerns about your condition or care  Treatment  will depend on the cause of your conjunctivitis  You may need antibiotics or allergy medicine as a pill, eye drop, or eye ointment  Manage your symptoms:   · Apply a cool compress  [facility]  Progress Note    Robin Oconnor Patient Status:  Inpatient    1960 MRN [id number]   Location [facility] 1W Attending John Olivares DO   Hosp Day # 3 PCP No primary care provider on file.     Subjective:  Patient resting in bed. He feels much improved. He denies fever or chills. He does not have anal pain, mild anal discomfort. Had a bowel movement yesterday, 1 piece of packing came out.    Objective/Physical Exam:  General: Alert, orientated x3.  Cooperative.  No apparent distress.  Vital Signs:  Blood pressure 116/62, pulse 63, temperature 98 °F (36.7 °C), temperature source Oral, resp. rate 18, height 5' 10\" (1.778 m), weight 150 lb 6.4 oz (68.2 kg), SpO2 94%.  Wt Readings from Last 3 Encounters:   24 150 lb 6.4 oz (68.2 kg)     Lungs: No respiratory distress.  Cardiac: Regular rate and rhythm.   Abdomen:  Soft, not distended, not tender, with no rebound or guarding.  No peritoneal signs.   Extremities:  No lower extremity edema noted.    Surgical site: C/D/I      Intake/Output:    Intake/Output Summary (Last 24 hours) at 2024 0828  Last data filed at 2024 0806  Gross per 24 hour   Intake 950 ml   Output 1750 ml   Net -800 ml     I/O last 3 completed shifts:  In: 1672 [P.O.:850; I.V.:522; IV PIGGYBACK:300]  Out: 2550 [Urine:2550]  I/O this shift:  In: -   Out: 200 [Urine:200]    Medications:    heparin  5,000 Units Subcutaneous Q8H KRYSTAL    piperacillin-tazobactam  4.5 g Intravenous Q8H       Labs:  Lab Results   Component Value Date    WBC 11.2 2024    HGB 14.6 2024    HCT 41.1 2024    .0 2024     Lab Results   Component Value Date     2024    K 4.4 2024     2024    CO2 28.0 2024    BUN 16 2024    CREATSERUM 1.14 2024    GLU 99 2024    CA 9.4 2024     No results found for: \"PT\", \"INR\"      Assessment  Patient Active Problem List   Diagnosis    Perianal abscess     POD  2: Rectal exam under anesthesia, Incision and drainage of posterior intersphincteric abscess, 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 Stable for discharge once cleared by other specialties. Home on antibiotic course.   Patient will follow up in clinic in 2 weeks, sooner if needed.      Quality:  DVT Mechanical Prophylaxis:   SCDs,    DVT Pharmacologic Prophylaxis   Medication    heparin (Porcine) 5000 UNIT/ML injection 5,000 Units                Code Status: Full Code  Nava: No urinary catheter in place  Nava Duration (in days):   Central line:    JEFFRY: 11/23/2024        JUANA Siddiqi  11/23/2024  8:28 AM           Wet a washcloth with cold water and place it on your eye  This will help decrease itching and irritation  · Do not wear contact lenses  They can irritate your eye  Throw away the pair you are using and ask when you can wear them again  Use a new pair of lenses when your healthcare provider says it is okay  · Avoid irritants  Stay away from smoke filled areas  Shield your eyes from wind and sun  · Flush your eye  You may need to flush your eye with saline to help decrease your symptoms  Ask for more information on how to flush your eye  Medicines:  Treatment depends on what is causing your conjunctivitis  You may be given any of the following:  · Allergy medicine  helps decrease itchy, red, swollen eyes caused by allergies  It may be given as a pill, eye drops, or nasal spray  · Antibiotics  may be needed if your conjunctivitis is caused by bacteria  This medicine may be given as a pill, eye drops, or eye ointment  · Take your medicine as directed  Contact your healthcare provider if you think your medicine is not helping or if you have side effects  Tell him or her if you are allergic to any medicine  Keep a list of the medicines, vitamins, and herbs you take  Include the amounts, and when and why you take them  Bring the list or the pill bottles to follow-up visits  Carry your medicine list with you in case of an emergency  Prevent the spread of conjunctivitis:   · Wash your hands with soap and water often  Wash your hands before and after you touch your eyes  Also wash your hands before you prepare or eat food and after you use the bathroom or change a diaper  · Avoid allergens  Try to avoid the things that cause your allergies, such as pets, dust, or grass  · Avoid contact with others  Do not share towels or washcloths  Try to stay away from others as much as possible  Ask when you can return to work or school  · Throw away eye makeup    The bacteria that caused your conjunctivitis can stay in eye makeup  Throw away mascara and other eye makeup  © 2017 [employer] [employer]  Information is for End User's use only and may not be sold, redistributed or otherwise used for commercial purposes  All illustrations and images included in CareNotes® are the copyrighted property of [employer] , [employer]  or [employer] Villafuerte  The above information is an  only  It is not intended as medical advice for individual conditions or treatments  Talk to your doctor, nurse or pharmacist before following any medical regimen to see if it is safe and effective for you

## 2024-11-23 NOTE — PLAN OF CARE
Problem: RISK FOR INFECTION - ADULT  Goal: Absence of fever/infection during anticipated neutropenic period  Description: INTERVENTIONS  - Monitor WBC  - Administer growth factors as ordered  - Implement neutropenic guidelines  Outcome: Progressing   Pt denies any pain. Outer gauze dressing removed, packing left in place. Pt had BM. Scant serosanguineous drainage noted. IV zosyn continued. Afebrile. Pt ambulating independently in room. Pt updated about plan of care.

## 2024-11-23 NOTE — PLAN OF CARE
Problem: Patient Centered Care  Goal: Patient preferences are identified and integrated in the patient's plan of care  Description: Interventions:  - What would you like us to know as we care for you? Lives at home  - Provide timely, complete, and accurate information to patient/family  - Incorporate patient and family knowledge, values, beliefs, and cultural backgrounds into the planning and delivery of care  - Encourage patient/family to participate in care and decision-making at the level they choose  - Honor patient and family perspectives and choices  Outcome: Progressing     Problem: Patient/Family Goals  Goal: Patient/Family Long Term Goal  Description: Patient's Long Term Goal: go home    Interventions:  - surgeon on cinsult  - IV antibiotics  -IV fluids  - monitor vital signs  - monitor lab results  - See additional Care Plan goals for specific interventions  Outcome: Progressing  Goal: Patient/Family Short Term Goal  Description: Patient's Short Term Goal: \"figure out what is wrong\"    Interventions:   - IV antibiotics  - IV  fluids  - surgery on consult  -NPO as ordered  - See additional Care Plan goals for specific interventions  Outcome: Progressing     Problem: RISK FOR INFECTION - ADULT  Goal: Absence of fever/infection during anticipated neutropenic period  Description: INTERVENTIONS  - Monitor WBC  - Administer growth factors as ordered  - Implement neutropenic guidelines  Outcome: Progressing

## 2024-11-23 NOTE — DISCHARGE SUMMARY
General Medicine Discharge Summary     Patient ID:  Robin Oconnor  64 year old  1/23/1960    Admit date: 11/20/2024    Discharge date and time: 11/23/2024  2:51 PM     Attending Physician: John Olivares DO     Consults: IP CONSULT TO GENERAL SURGERY  IP CONSULT TO INFECTIOUS DISEASE    Primary Care Physician: No primary care provider on file.     Reason for admission: Rectal pain perianal abscess    Risk For Readmission: Low    Discharge Diagnoses: Perianal abscess [K61.0]  See Additional Discharge Diagnoses in Hospital Course    Discharged Condition: good    Follow-up with labs/images appointments:   Close follow-up with primary care provider was recommended 1 was given to him at discharge  -Follow-up with GI provider was also recommended as patient needs a colonoscopy to rule out inflammatory bowel disease  Follow up with surgical provider also suggested      Exam  Gen: No acute distress  Pulm: Lungs clear, normal respiratory effort  CV: Heart with regular rate and rhythm  Abd: Abdomen soft,   EXT: no edema     HPI:   Per Dr. Saucedo    Patient is a 64 year old male with PMH sig for psoriasis not on medication, hx yumiko-anal abscess 2021 that was drained (tx at [facility]) here w 3 days severe pain similar to 2021 episode. Progressively worsening- localized to perianal region no radiation denies fevers or other focal sx. No known hx DM, not on immunosuppressives does have a long standing hx psoriasis. Labs/ CT as noted below.         Hospital Course:     Admitted for recurrent perianal abscess ultimately underwent I&D in the operating room 11/22 operating room cultures revealed E. coli and Klebsiella patient was treated with Zosyn throughout the hospital stay and was ultimately discharged on oral antibiotics, will complete 10 days of Augmentin.  Neurology was pending at the time of discharge he will need close follow-up with his primary care provider and will need to see GI for an outpatient colonoscopy to rule out  inflammatory bowel disease    Operative Procedures: Procedure(s) (LRB):  RECTAL EXAM UNDER ANESTHESIA AND INCISION AND DRAINAGE OF PERIANAL ABSCESS, AND PERIANAL BIOPSIES (N/A)  ANAL ABSCESS IRRIGATION & DEBRIDEMENT (N/A)     Imaging: No results found.    Disposition: home    Activity: activity as tolerated  Diet: regular diet  Wound Care: keep wound clean and dry  Code Status: Full Code  O2: none    Home Medication Changes: See list below    Med list     Medication List        START taking these medications      acetaminophen 500 MG Tabs  Commonly known as: Tylenol Extra Strength  Take 2 tablets (1,000 mg total) by mouth every 4 (four) hours as needed for Pain.     amoxicillin clavulanate 875-125 MG Tabs  Commonly known as: Augmentin  Take 1 tablet by mouth 2 (two) times daily for 10 days.     Ibuprofen 200 MG Caps  Commonly known as: Advil  Take 1 capsule (200 mg total) by mouth every 6 (six) hours as needed.     oxyCODONE 5 MG Tabs  Take 1 tablet (5 mg total) by mouth every 4 (four) hours as needed for Pain.            CONTINUE taking these medications      Esomeprazole Magnesium 20 MG Cpdr  Commonly known as: NEXIUM     Neuriva Chew     omega-3 fatty acids 1000 MG Caps  Commonly known as: Fish Oil     ONE A DAY MEN 50 PLUS OR               Where to Get Your Medications        These medications were sent to [id number] - [place], [place] - [address] [address] [facility] [phone], [phone]  [address] [facility] [address]      Phone: [phone]   acetaminophen 500 MG Tabs  amoxicillin clavulanate 875-125 MG Tabs  Ibuprofen 200 MG Caps  oxyCODONE 5 MG Tabs         FU   Follow-up Information       [facility]. Schedule an appointment as soon as possible for a visit in 2 week(s).    Why: post operative check  Contact information:  [id number]  [facility] [address] [phone]               Zechariah Dick MD Follow up in 2 week(s).    Specialty: HOSPITALIST  Contact information:  [address] [facility] [address] [facility] [address]  [facility] [address]  [phone]                             DC instructions:      Other Discharge Instructions:         POST-OPERATIVE INSTRUCTIONS FOR ANORECTAL SURGERY    OBTAIN THE FOLLOWING FROM THE DRUGSTORE    PAIN  MEDICATION - A narcotic pain prescription may be provided, if not, ibuprofen (Advil)/acetaminophen (Tylenol) can be used to control pain and can be less constipating.  DO NOT exceed 4000 mg acetaminophen in 24 hours or 3200 mg ibuprofen in 24 hours.    METAMUCIL or similar fiber supplement is recommended on a daily basis.    COLACE or MIRALAX is recommended on a daily basis for 2 weeks to avoid hard bowel movements if taking prescription narcotics    SPECIAL INSTRUCTIONS    Remove the external gauze later in the day or during your first shower/bath.    On occasion a dissolvable foam (Gelfoam™) or gauze (Surgicel™) is used in the anal canal. This material will pass spontaneously often turning brown in color. Flush it down the toilet.    Avoiding straining or sitting on the toilet for long periods of time or heavy lifting especially the first day after surgery.  The increased pressure can aggravate swelling and bleeding.    Slight bleeding and drainage is usual after this procedure.  Report “excessive” bleeding or passage of clots to the office.  Use non-cotton gauze, sanitary pads or minipads as needed for bleeding and drainage.     Warm showers or baths are recommended 2 to 3 times per day or as needed in the post- operative period for discomfort and to keep area clean.    You can purchase a hand-held shower sprayer, bidet, sitz bath, or squirt bottle to keep the tissues clean in the yumiko-anal area after bowel movements and as needed.    Avoid a hot shower immediately after surgery since the sedation used during procedure may precipitate light-headedness or fainting.    Resume your regular diet.                                                       Report severe constipation  or diarrhea to the office.  Contact the office immediately if you are unable to urinate or if you have fever or chills.    Do Not Use enemas or suppositories after surgery unless specifically instructed by the office.     Contact the office the following business day after surgery to inform us of your progress and to make your follow-up appointment.                                                                              Do not drive/operate heavy machinery while you are taking narcotic pain medication or if your pain is too severe to allow you to react appropriately.    A small amount of bloody drainage can occur for several days and sometime weeks depending on the nature and severity of the surgical procedure    Please call the office at [phone] if you have any questions or concerns. Thank you for the privilege to be part of your care team.       Ina Beasley MD  [facility] - General Surgery   [address] [place] [address]  p [phone]    You need to establish with a primary care provider to complete a colonoscopy once you have healed             I reconciled current and discharge medications on day of discharge, discussed changes with patient and noted changes above.       Total Time Coordinating Care: 35 minutes    Patient had opportunity to ask questions and state understand and agree with therapeutic plan as outlined    Thank You,    John Olivares,    Hospitalist with [facility]

## 2024-11-23 NOTE — PLAN OF CARE
Problem: Patient Centered Care  Goal: Patient preferences are identified and integrated in the patient's plan of care  Description: Interventions:  - What would you like us to know as we care for you? Lives at home  - Provide timely, complete, and accurate information to patient/family  - Incorporate patient and family knowledge, values, beliefs, and cultural backgrounds into the planning and delivery of care  - Encourage patient/family to participate in care and decision-making at the level they choose  - Honor patient and family perspectives and choices  Outcome: Adequate for Discharge     Problem: Patient/Family Goals  Goal: Patient/Family Long Term Goal  Description: Patient's Long Term Goal: go home    Interventions:  - surgeon on cinsult  - IV antibiotics  -IV fluids  - monitor vital signs  - monitor lab results  - See additional Care Plan goals for specific interventions  Outcome: Adequate for Discharge  Goal: Patient/Family Short Term Goal  Description: Patient's Short Term Goal: \"figure out what is wrong\"    Interventions:   - IV antibiotics  - IV  fluids  - surgery on consult  - See additional Care Plan goals for specific interventions  Outcome: Adequate for Discharge     Problem: RISK FOR INFECTION - ADULT  Goal: Absence of fever/infection during anticipated neutropenic period  Description: INTERVENTIONS  - Monitor WBC  - Administer growth factors as ordered  - Implement neutropenic guidelines  Outcome: Adequate for Discharge   Vital signs stable. Complaint of mild pain, refused pain medication. Up and about in room. Examined by Dr. Olivares at bedside, discharge order written. Per Dr. Laurel soliman to go home. Saline lock discontinued. Discharge instructions given and discussed with patient and partner. Instructions includes medications to  at his pharmacy, medications to continue taking at home and when to take the next dose. Also discussed about follow up appointments. Patient verbalized  understanding of all instructions. Discharged home in stable condition.

## 2024-11-25 ENCOUNTER — TELEPHONE (OUTPATIENT)
Dept: SURGERY | Facility: CLINIC | Age: 64
End: 2024-11-25

## 2024-11-25 NOTE — TELEPHONE ENCOUNTER
Patient would like to schedule 2 week post op with Dr Ina Beasley. Patient is requesting to get a return to work note for 12/2/2024.

## 2024-11-26 ENCOUNTER — PATIENT OUTREACH (OUTPATIENT)
Dept: CASE MANAGEMENT | Age: 64
End: 2024-11-26

## 2024-11-26 ENCOUNTER — TELEPHONE (OUTPATIENT)
Dept: SURGERY | Facility: CLINIC | Age: 64
End: 2024-11-26

## 2024-11-26 DIAGNOSIS — K61.0 PERIANAL ABSCESS: ICD-10-CM

## 2024-11-26 DIAGNOSIS — Z02.9 ENCOUNTERS FOR ADMINISTRATIVE PURPOSE: Primary | ICD-10-CM

## 2024-11-26 NOTE — TELEPHONE ENCOUNTER
Per Maricruz, patient wants to know if he has any physical restrictions. Patient wants to go for walks asking if okay to do so. Per Maricruz please call patient directly at [phone].

## 2024-11-26 NOTE — PROGRESS NOTES
Transitional Care Management   Discharge Date: 24  Contact Date: 2024    Assessment:  TCM Initial Assessment    General:  Assessment completed with: Patient  Patient Subjective: Spoke with patient who reports he is doing ok. Patient states he is taking Tylenol prn and feels pain is controlled right now. Patient denies concern for constipation. Having regular bms. Patient reports once in a while is getting minimal drainage. No blood. Patient reports he removed the external gauze first day upon coming home from the hospital. The patient denies any fevers, chills, chest pain, shortness of breath, nausea, vomiting. Reports his appetite has been good. Patient states walking is painful for him so he has not been doing much walking. Sitting is uncomfortable. Patient denies any concerns at this time.  Chief Complaint: Perianal abscess  Verify patient name and  with patient/ caregiver: Yes    Hospital Stay/Discharge:  Tell me what you understand of why you were in the hospital or emergency department: an abscess  Prior to leaving the hospital were your Discharge Instructions reviewed with you?: Yes  Did you receive a copy of your written Discharge Instructions?: Yes  What questions do you have about your Discharge Instructions?: activity restrictions  Do you feel better or worse since you left the hospital or emergency department?: Better    Follow - Up Appointment:  Do you have a follow-up appointment?: Yes  Date: 24  Physician: Dr. Beasley  Are there any barriers to getting to your follow-up appointment?: No    Home Health/DME:  Prior to leaving the hospital was Home Health (HH) arranged for you?: N/A  Are HH needs identified by staff during the assessment?: No     Prior to leaving the hospital or emergency department was Durable Medical Equipment (DME), medical supplies, or infusions arranged for you?: N/A  Are DME/medical supply/infusions needs identified by staff during this assessment?: No      Medications/Diet:  Did any of your medications change, during or after your hospital stay or ED visit?: Yes  Do you have your new or updated medications?: Yes  Do you understand what your medications are for and possible side effects?: Yes  Are there any reasons that keep you from taking your medication as prescribed?: No  Any concerns about medication refills?: No    Were you given a different diet per your Discharge Instructions?: No     Questions/Concerns:  Do you have any questions or concerns that have not been discussed?: Yes         Nursing Interventions:    NCM confirmed with patient he does not have a PCP however patient reports he is scheduled to establish care with Dr. Laureano Dick with [facility] on 12/09/2024.     Postop appointment scheduled for 12/04/2024.     NCM advised patient to be careful with Oxycodone because it can cause constipation. Per discharge instructions he can take Metamucil and Colace and these can be purchased over the counter.     I advised patient to not exceed 4000 mg of Tylenol in 24 hrs. Patient verbalized understanding.     Patient states he needs a return to work letter- I advised patient this letter has been written and released to [employer] but he does not have [employer] access. I offered to assist with setting him up on [employer] and he declined-- he states he is going to go directly to Dr. Beasley's office to  the letter. The patient does have questions regarding his activity restrictions and what he is allowed to do such as going for walks. I did advise patient since this information is not listed on his discharge instructions we need to clarify with his Surgeon. I called Dr. Beasley's office and spoke with Jaclyn. She is placing a message to clinical staff to have the patient contacted.     All d/c instructions reviewed with pt.  Reviewed when to call MD vs when to go to ER/call 911.  Educated pt on the importance of taking all meds as prescribed as well as close f/u with  PCP/specialists.  Pt verbalized understanding and will contact his Surgeon or PCP with any further questions or concerns.       Medication Review:   Current Outpatient Medications   Medication Sig Dispense Refill    amoxicillin clavulanate 875-125 MG Oral Tab Take 1 tablet by mouth 2 (two) times daily for 10 days. 20 tablet 0    oxyCODONE 5 MG Oral Tab Take 1 tablet (5 mg total) by mouth every 4 (four) hours as needed for Pain. 3 tablet 0    Ibuprofen (ADVIL) 200 MG Oral Cap Take 1 capsule (200 mg total) by mouth every 6 (six) hours as needed. 30 capsule 0    acetaminophen 500 MG Oral Tab Take 2 tablets (1,000 mg total) by mouth every 4 (four) hours as needed for Pain. 30 tablet 0    Multiple Vitamins-Minerals (ONE A DAY MEN 50 PLUS OR) Take 1 tablet by mouth daily with dinner.      omega-3 fatty acids 1000 MG Oral Cap Take 1,000 mg by mouth daily with dinner.      Esomeprazole Magnesium 20 MG Oral Capsule Delayed Release Take 1 capsule (20 mg total) by mouth every morning before breakfast.      Misc Natural Products (NEURIVA) Oral Chew Tab Chew 2 tablets by mouth at bedtime.       Did patient review medications using current pill bottles and not just a medication list?  Yes  Discharge medications reviewed/discussed/and reconciled against outpatient medications with patient.  Any changes or updates to medications sent to primary care provider.  Patient Acknowledged      Follow-up Appointments:  Your appointments       Date & Time Appointment Department (Center)    Dec 04, 2024 1:15 PM CST Post Op Visit with [facility] ([facility])              [place] [address] [address] [id number] [phone]            Transitional Care Clinic  Was TCC Ordered: No    Primary Care Provider (If no TCC appointment)  Does patient already have a PCP  appointment scheduled? Yes  Nurse Care Manager Attempted to schedule PCP office HFU appointment with patient      Specialist  Does the patient have any other follow-up appointment(s) that need to be scheduled? No   -If yes: Nurse Care Manager reviewed upcoming specialist appointments with patient: Yes   -Does the patient need assistance scheduling appointment(s): No    CCM referral placed:  Not Applicable

## 2024-12-04 ENCOUNTER — NURSE ONLY (OUTPATIENT)
Dept: SURGERY | Facility: CLINIC | Age: 64
End: 2024-12-04
Payer: COMMERCIAL

## 2024-12-04 VITALS
BODY MASS INDEX: 22 KG/M2 | HEART RATE: 77 BPM | SYSTOLIC BLOOD PRESSURE: 132 MMHG | DIASTOLIC BLOOD PRESSURE: 70 MMHG | WEIGHT: 150 LBS

## 2024-12-04 DIAGNOSIS — Z48.89 ENCOUNTER FOR POSTOPERATIVE CARE: Primary | ICD-10-CM

## 2024-12-04 PROCEDURE — 99024 POSTOP FOLLOW-UP VISIT: CPT

## 2024-12-04 NOTE — PROGRESS NOTES
S:  Robin Oconnor is a 64 year old male sp incision and drainage of perirectal abscess  Doing well, no fevers, no chills, tolerating a general diet, generally normal bowel movements, no calf tenderness nor lower extremity edema, no shortness of breath, no chest pain.   Reports some burning with bowel movement, but generally pain is well controlled    O:  /70 (BP Location: Right arm, Patient Position: Sitting, Cuff Size: adult)   Pulse 77   Wt 150 lb (68 kg)   BMI 21.52 kg/m²   GEN:  No acute distress  L: nonlabored respirations  H: reg rate  Abd:  Soft, NT,ND.  Skin: perianal region looks healthy, no signs of cellulitis or significant drainage  Extr: No edema, no calf tenderness    Path:  Reviewed w pt    Assessment   1. Encounter for postoperative care        Doing well post op  Continue to keep perianal area clean and dry.  Limit heavy lifting as able  Keep bowel movements soft  Maintain a healthy diet.  Maintain good hydration.  F/u prn.         Marbin Victoria PA-C

## (undated) DEVICE — SOLUTION IRRIG 1000ML 0.9% NACL USP BTL

## (undated) DEVICE — STRAP OR POS W3.5XL19IN FOAM STRRP W/ SLIP

## (undated) DEVICE — PAD,ABDOMINAL,8"X7.5",STERILE,LF,1/PK: Brand: MEDLINE

## (undated) DEVICE — SKIN PREP TRAY 4 COMPARTM TRAY: Brand: MEDLINE INDUSTRIES, INC.

## (undated) DEVICE — SPONGE 4X4 10PK

## (undated) DEVICE — DRAPE,LITHOTOMY,STERILE: Brand: MEDLINE

## (undated) DEVICE — GAUZE,PACKING STRIP,IODOFORM,1"X5YD,STRL: Brand: CURAD

## (undated) DEVICE — GAMMEX® PI HYBRID SIZE 7.5, STERILE POWDER-FREE SURGICAL GLOVE, POLYISOPRENE AND NEOPRENE BLEND: Brand: GAMMEX

## (undated) DEVICE — GLOVE SUR 8 SENSICARE PI PIP GRN PWD F

## (undated) DEVICE — MINOR GENERAL: Brand: MEDLINE INDUSTRIES, INC.

## (undated) DEVICE — DRAPE,UNDRBUT,WHT GRAD PCH,CAPPORT,20/CS: Brand: MEDLINE

## (undated) DEVICE — GLOVE SUR 7 SENSICARE PI MIC PIP CRM PWD F

## (undated) DEVICE — PAD PREP 24X43.5IN W/ PCH

## (undated) DEVICE — UNDERPANTS MAT 2XL FOR 24-64IN WAIST PUR

## (undated) DEVICE — GLOVE SUR 8 SENSICARE PI PIP CRM PWD F

## (undated) NOTE — LETTER
[facility]  Administration of Anesthesia  IRobin agree to be cared for by a physician anesthesiologist alone and/or with a nurse anesthetist, who is specially trained to monitor me and give me medicine to put me to sleep or keep me comfortable during my procedure    I understand that my anesthesiologist and/or anesthetist is not an employee or agent of [facility] or [employer]. He or she works for [facility]    As the patient asking for anesthesia services, I agree to:  Allow the anesthesiologist (anesthesia doctor) to give me medicine and do additional procedures as necessary. Some examples are: Starting or using an “IV” to give me medicine, fluids or blood during my procedure, and having a breathing tube placed to help me breathe when I’m asleep (intubation). In the event that my heart stops working properly, I understand that my anesthesiologist will make every effort to sustain my life, unless otherwise directed by [facility] Do Not Resuscitate documents.  Tell my anesthesia doctor before my procedure:  If I am pregnant.  The last time that I ate or drank.  iii. All of the medicines I take (including prescriptions, herbal supplements, and pills I can buy without a prescription (including street drugs/illegal medications). Failure to inform my anesthesiologist about these medicines may increase my risk of anesthetic complications.  iv.If I am allergic to anything or have had a reaction to anesthesia before.  I understand how the anesthesia medicine will help me (benefits).  I understand that with any type of anesthesia medicine there are risks:  The most common risks are: nausea, vomiting, sore throat, muscle soreness, damage to my eyes, mouth, or teeth (from breathing tube placement).  Rare risks include: remembering what happened during my procedure, allergic reactions to medications, injury to my airway, heart, lungs, vision, nerves, or muscles  and in extremely rare instances death.  My doctor has explained to me other choices available to me for my care (alternatives).  Pregnant Patients (“epidural”):  I understand that the risks of having an epidural (medicine given into my back to help control pain during labor), include itching, low blood pressure, difficulty urinating, headache or slowing of the baby’s heart. Very rare risks include infection, bleeding, seizure, irregular heart rhythms and nerve injury.  Regional Anesthesia (“spinal”, “epidural”, & “nerve blocks”):  I understand that rare but potential complications include headache, bleeding, infection, seizure, irregular heart rhythms, and nerve injury.    _____________________________________________________________________________  Patient (or Representative) Signature/Relationship to Patient  Date   Time    _____________________________________________________________________________   Name (if used)    Language/Organization   Time    _____________________________________________________________________________  Nurse Anesthetist Signature     Date   Time  _____________________________________________________________________________  Anesthesiologist Signature     Date   Time  I have discussed the procedure and information above with the patient (or patient’s representative) and answered their questions. The patient or their representative has agreed to have anesthesia services.    _____________________________________________________________________________  Witness        Date   Time  I have verified that the signature is that of the patient or patient’s representative, and that it was signed before the procedure  Patient Name: Robin Oconnor     : 1960                 Printed: 2024 at 7:32 PM    Medical Record #: [id number]                                            Page 1 of 1  ----------ANESTHESIA CONSENT----------

## (undated) NOTE — LETTER
11/25/2024          To Whom It May Concern:    Robin Oconnor is currently under my medical care and may return to work on Monday 12/2/24, as tolerated.     If you require additional information please contact our office.        Sincerely,    Ina Beasley MD          Document generated by:  Meenakshi ONEIL RN

## (undated) NOTE — LETTER
[facility]  [address], [place], [place]    Authorization for Surgical Operation and Procedure                               I hereby authorize Ina Beasley MD, my physician and his/her assistants (if applicable), which may include medical students, residents, and/or fellows, to perform the following surgical operation/ procedure and administer such anesthesia as may be determined necessary by my physician: Operation/Procedure name (s) RECTAL EXAM UNDER ANESTHESIA AND INCISION AND DRAINAGE OF PERIANAL ABSCESS on Robin Oconnor   2.   I recognize that during the surgical operation/procedure, unforeseen conditions may necessitate additional or different procedures than those listed above.  I, therefore, further authorize and request that the above-named surgeon, assistants, or designees perform such procedures as are, in their judgment, necessary and desirable.    3.   My surgeon/physician has discussed prior to my surgery the potential benefits, risks and side effects of this procedure; the likelihood of achieving goals; and potential problems that might occur during recuperation.  They also discussed reasonable alternatives to the procedure, including risks, benefits, and side effects related to the alternatives and risks related to not receiving this procedure.  I have had all my questions answered and I acknowledge that no guarantee has been made as to the result that may be obtained.    4.   Should the need arise during my operation/procedure, which includes change of level of care prior to discharge, I also consent to the administration of blood and/or blood products.  Further, I understand that despite careful testing and screening of blood or blood products by collecting agencies, I may still be subject to ill effects as a result of receiving a blood transfusion and/or blood products.  The following are some, but not all, of the potential risks that can occur: fever and allergic reactions,  hemolytic reactions, transmission of diseases such as Hepatitis, AIDS and Cytomegalovirus (CMV) and fluid overload.  In the event that I wish to have an autologous transfusion of my own blood, or a directed donor transfusion, I will discuss this with my physician.  Check only if Refusing Blood or Blood Products  I understand refusal of blood or blood products as deemed necessary by my physician may have serious consequences to my condition to include possible death. I hereby assume responsibility for my refusal and release the hospital, its personnel, and my physicians from any responsibility for the consequences of my refusal.    o  Refuse   5.   I authorize the use of any specimen, organs, tissues, body parts or foreign objects that may be removed from my body during the operation/procedure for diagnosis, research or teaching purposes and their subsequent disposal by hospital authorities.  I also authorize the release of specimen test results and/or written reports to my treating physician on the hospital medical staff or other referring or consulting physicians involved in my care, at the discretion of the Pathologist or my treating physician.    6.   I consent to the photographing or videotaping of the operations or procedures to be performed, including appropriate portions of my body for medical, scientific, or educational purposes, provided my identity is not revealed by the pictures or by descriptive texts accompanying them.  If the procedure has been photographed/videotaped, the surgeon will obtain the original picture, image, videotape or CD.  The hospital will not be responsible for storage, release or maintenance of the picture, image, tape or CD.    7.   I consent to the presence of a  or observers in the operating room as deemed necessary by my physician or their designees.    8.   I recognize that in the event my procedure results in extended X-Ray/fluoroscopy time, I may develop a  skin reaction.    9. If I have a Do Not Attempt Resuscitation (DNAR) order in place, that status will be suspended while in the operating room, procedural suite, and during the recovery period unless otherwise explicitly stated by me (or a person authorized to consent on my behalf). The surgeon or my attending physician will determine when the applicable recovery period ends for purposes of reinstating the DNAR order.  10. Patients having a sterilization procedure: I understand that if the procedure is successful the results will be permanent and it will therefore be impossible for me to inseminate, conceive, or bear children.  I also understand that the procedure is intended to result in sterility, although the result has not been guaranteed.   11. I acknowledge that my physician has explained sedation/analgesia administration to me including the risk and benefits I consent to the administration of sedation/analgesia as may be necessary or desirable in the judgment of my physician.    I CERTIFY THAT I HAVE READ AND FULLY UNDERSTAND THE ABOVE CONSENT TO OPERATION and/or OTHER PROCEDURE.     ____________________________________  _________________________________        ______________________________  Signature of Patient    Signature of Responsible Person                Printed Name of Responsible Person                                      ____________________________________  _____________________________                ________________________________  Signature of Witness        Date  Time         Relationship to Patient    STATEMENT OF PHYSICIAN My signature below affirms that prior to the time of the procedure; I have explained to the patient and/or his/her legal representative, the risks and benefits involved in the proposed treatment and any reasonable alternative to the proposed treatment. I have also explained the risks and benefits involved in refusal of the proposed treatment and alternatives to the  proposed treatment and have answered the patient's questions. If I have a significant financial interest in a co-management agreement or a significant financial interest in any product or implant, or other significant relationship used in this procedure/surgery, I have disclosed this and had a discussion with my patient.     _____________________________________________________              _____________________________  (Signature of Physician)                                                                                         (Date)                                   (Time)  Patient Name: Robin Oconnor      : 1960      Printed: 2024     Medical Record #: [id number]                                      Page 1 of 1

## (undated) NOTE — LETTER
[facility]  Administration of Anesthesia  IRobin agree to be cared for by a physician anesthesiologist alone and/or with a nurse anesthetist, who is specially trained to monitor me and give me medicine to put me to sleep or keep me comfortable during my procedure    I understand that my anesthesiologist and/or anesthetist is not an employee or agent of [facility] or [employer]. He or she works for [facility]    As the patient asking for anesthesia services, I agree to:  Allow the anesthesiologist (anesthesia doctor) to give me medicine and do additional procedures as necessary. Some examples are: Starting or using an “IV” to give me medicine, fluids or blood during my procedure, and having a breathing tube placed to help me breathe when I’m asleep (intubation). In the event that my heart stops working properly, I understand that my anesthesiologist will make every effort to sustain my life, unless otherwise directed by [facility] Do Not Resuscitate documents.  Tell my anesthesia doctor before my procedure:  If I am pregnant.  The last time that I ate or drank.  iii. All of the medicines I take (including prescriptions, herbal supplements, and pills I can buy without a prescription (including street drugs/illegal medications). Failure to inform my anesthesiologist about these medicines may increase my risk of anesthetic complications.  iv.If I am allergic to anything or have had a reaction to anesthesia before.  I understand how the anesthesia medicine will help me (benefits).  I understand that with any type of anesthesia medicine there are risks:  The most common risks are: nausea, vomiting, sore throat, muscle soreness, damage to my eyes, mouth, or teeth (from breathing tube placement).  Rare risks include: remembering what happened during my procedure, allergic reactions to medications, injury to my airway, heart, lungs, vision, nerves, or muscles  and in extremely rare instances death.  My doctor has explained to me other choices available to me for my care (alternatives).  Pregnant Patients (“epidural”):  I understand that the risks of having an epidural (medicine given into my back to help control pain during labor), include itching, low blood pressure, difficulty urinating, headache or slowing of the baby’s heart. Very rare risks include infection, bleeding, seizure, irregular heart rhythms and nerve injury.  Regional Anesthesia (“spinal”, “epidural”, & “nerve blocks”):  I understand that rare but potential complications include headache, bleeding, infection, seizure, irregular heart rhythms, and nerve injury.    _____________________________________________________________________________  Patient (or Representative) Signature/Relationship to Patient  Date   Time    _____________________________________________________________________________   Name (if used)    Language/Organization   Time    _____________________________________________________________________________  Nurse Anesthetist Signature     Date   Time  _____________________________________________________________________________  Anesthesiologist Signature     Date   Time  I have discussed the procedure and information above with the patient (or patient’s representative) and answered their questions. The patient or their representative has agreed to have anesthesia services.    _____________________________________________________________________________  Witness        Date   Time  I have verified that the signature is that of the patient or patient’s representative, and that it was signed before the procedure  Patient Name: Robin Oconnor     : 1960                 Printed: 2024 at 7:32 PM    Medical Record #: [id number]                                            Page 1 of 1  ----------ANESTHESIA CONSENT----------